# Patient Record
Sex: FEMALE | Race: OTHER
[De-identification: names, ages, dates, MRNs, and addresses within clinical notes are randomized per-mention and may not be internally consistent; named-entity substitution may affect disease eponyms.]

---

## 2017-10-10 ENCOUNTER — RESULT REVIEW (OUTPATIENT)
Age: 72
End: 2017-10-10

## 2017-11-21 ENCOUNTER — OUTPATIENT (OUTPATIENT)
Dept: OUTPATIENT SERVICES | Facility: HOSPITAL | Age: 72
LOS: 1 days | End: 2017-11-21
Payer: COMMERCIAL

## 2017-11-21 DIAGNOSIS — R94.31 ABNORMAL ELECTROCARDIOGRAM [ECG] [EKG]: ICD-10-CM

## 2017-11-21 PROCEDURE — 93017 CV STRESS TEST TRACING ONLY: CPT

## 2017-11-21 PROCEDURE — 78452 HT MUSCLE IMAGE SPECT MULT: CPT

## 2017-11-21 PROCEDURE — A9502: CPT

## 2017-11-22 ENCOUNTER — OUTPATIENT (OUTPATIENT)
Dept: OUTPATIENT SERVICES | Facility: HOSPITAL | Age: 72
LOS: 1 days | End: 2017-11-22
Payer: COMMERCIAL

## 2017-11-22 VITALS
RESPIRATION RATE: 18 BRPM | OXYGEN SATURATION: 97 % | HEIGHT: 63 IN | WEIGHT: 141.98 LBS | SYSTOLIC BLOOD PRESSURE: 140 MMHG | HEART RATE: 78 BPM | TEMPERATURE: 99 F | DIASTOLIC BLOOD PRESSURE: 70 MMHG

## 2017-11-22 DIAGNOSIS — Z98.890 OTHER SPECIFIED POSTPROCEDURAL STATES: Chronic | ICD-10-CM

## 2017-11-22 DIAGNOSIS — Z90.710 ACQUIRED ABSENCE OF BOTH CERVIX AND UTERUS: Chronic | ICD-10-CM

## 2017-11-22 DIAGNOSIS — M16.11 UNILATERAL PRIMARY OSTEOARTHRITIS, RIGHT HIP: ICD-10-CM

## 2017-11-22 DIAGNOSIS — Z01.818 ENCOUNTER FOR OTHER PREPROCEDURAL EXAMINATION: ICD-10-CM

## 2017-11-22 PROCEDURE — 71046 X-RAY EXAM CHEST 2 VIEWS: CPT

## 2017-11-22 PROCEDURE — 86901 BLOOD TYPING SEROLOGIC RH(D): CPT

## 2017-11-22 PROCEDURE — 86850 RBC ANTIBODY SCREEN: CPT

## 2017-11-22 PROCEDURE — 71020: CPT | Mod: 26

## 2017-11-22 PROCEDURE — G0463: CPT

## 2017-11-22 PROCEDURE — 87641 MR-STAPH DNA AMP PROBE: CPT

## 2017-11-22 PROCEDURE — 87640 STAPH A DNA AMP PROBE: CPT

## 2017-11-22 PROCEDURE — 86900 BLOOD TYPING SEROLOGIC ABO: CPT

## 2017-11-22 NOTE — H&P PST ADULT - PSH
History of hand surgery  both hands tendon and bone repair 2000  History of shoulder surgery  right shoulder x 3  S/P bunionectomy  bilateral  S/P hysterectomy

## 2017-11-22 NOTE — H&P PST ADULT - NSANTHOSAYNRD_GEN_A_CORE
No. GAYATHRI screening performed.  STOP BANG Legend: 0-2 = LOW Risk; 3-4 = INTERMEDIATE Risk; 5-8 = HIGH Risk

## 2017-11-22 NOTE — H&P PST ADULT - NEGATIVE CARDIOVASCULAR SYMPTOMS
no orthopnea/no palpitations/no dyspnea on exertion/no paroxysmal nocturnal dyspnea/no chest pain/no peripheral edema/no claudication

## 2017-11-22 NOTE — H&P PST ADULT - FAMILY HISTORY
Father  Still living? No  Family history of heart attack, Age at diagnosis: Age Unknown     Mother  Still living? No  Family history of stroke, Age at diagnosis: Age Unknown     Sibling  Still living? No  Family history of systemic lupus erythematosus, Age at diagnosis: Age Unknown  Family history of cancer, Age at diagnosis: Age Unknown

## 2017-11-23 LAB
MRSA PCR RESULT.: SIGNIFICANT CHANGE UP
S AUREUS DNA NOSE QL NAA+PROBE: SIGNIFICANT CHANGE UP

## 2017-12-04 ENCOUNTER — TRANSCRIPTION ENCOUNTER (OUTPATIENT)
Age: 72
End: 2017-12-04

## 2017-12-04 RX ORDER — TRAMADOL HYDROCHLORIDE 50 MG/1
50 TABLET ORAL ONCE
Qty: 0 | Refills: 0 | Status: DISCONTINUED | OUTPATIENT
Start: 2017-12-05 | End: 2017-12-05

## 2017-12-04 RX ORDER — GABAPENTIN 400 MG/1
100 CAPSULE ORAL ONCE
Qty: 0 | Refills: 0 | Status: COMPLETED | OUTPATIENT
Start: 2017-12-05 | End: 2017-12-05

## 2017-12-04 RX ORDER — SODIUM CHLORIDE 9 MG/ML
3 INJECTION INTRAMUSCULAR; INTRAVENOUS; SUBCUTANEOUS EVERY 8 HOURS
Qty: 0 | Refills: 0 | Status: DISCONTINUED | OUTPATIENT
Start: 2017-12-05 | End: 2017-12-05

## 2017-12-04 RX ORDER — CELECOXIB 200 MG/1
200 CAPSULE ORAL ONCE
Qty: 0 | Refills: 0 | Status: COMPLETED | OUTPATIENT
Start: 2017-12-05 | End: 2017-12-05

## 2017-12-05 ENCOUNTER — INPATIENT (INPATIENT)
Facility: HOSPITAL | Age: 72
LOS: 5 days | Discharge: NURSING, REHAB OR LONG-TERM CARE | DRG: 470 | End: 2017-12-11
Attending: ORTHOPAEDIC SURGERY | Admitting: ORTHOPAEDIC SURGERY
Payer: COMMERCIAL

## 2017-12-05 ENCOUNTER — RESULT REVIEW (OUTPATIENT)
Age: 72
End: 2017-12-05

## 2017-12-05 VITALS
OXYGEN SATURATION: 96 % | DIASTOLIC BLOOD PRESSURE: 66 MMHG | SYSTOLIC BLOOD PRESSURE: 119 MMHG | HEART RATE: 79 BPM | HEIGHT: 63 IN | TEMPERATURE: 98 F | RESPIRATION RATE: 18 BRPM | WEIGHT: 141.98 LBS

## 2017-12-05 DIAGNOSIS — Z98.890 OTHER SPECIFIED POSTPROCEDURAL STATES: Chronic | ICD-10-CM

## 2017-12-05 DIAGNOSIS — M16.11 UNILATERAL PRIMARY OSTEOARTHRITIS, RIGHT HIP: ICD-10-CM

## 2017-12-05 DIAGNOSIS — Z90.710 ACQUIRED ABSENCE OF BOTH CERVIX AND UTERUS: Chronic | ICD-10-CM

## 2017-12-05 LAB
ANION GAP SERPL CALC-SCNC: 8 MMOL/L — SIGNIFICANT CHANGE UP (ref 5–17)
BUN SERPL-MCNC: 16 MG/DL — SIGNIFICANT CHANGE UP (ref 7–18)
CALCIUM SERPL-MCNC: 7.9 MG/DL — LOW (ref 8.4–10.5)
CHLORIDE SERPL-SCNC: 109 MMOL/L — HIGH (ref 96–108)
CO2 SERPL-SCNC: 26 MMOL/L — SIGNIFICANT CHANGE UP (ref 22–31)
CREAT SERPL-MCNC: 0.46 MG/DL — LOW (ref 0.5–1.3)
GLUCOSE SERPL-MCNC: 87 MG/DL — SIGNIFICANT CHANGE UP (ref 70–99)
HCT VFR BLD CALC: 31.5 % — LOW (ref 34.5–45)
HGB BLD-MCNC: 10.2 G/DL — LOW (ref 11.5–15.5)
HIV 1 & 2 AB SERPL IA.RAPID: SIGNIFICANT CHANGE UP
HIV 1+2 AB+HIV1 P24 AG SERPL QL IA: SIGNIFICANT CHANGE UP
MCHC RBC-ENTMCNC: 30.5 PG — SIGNIFICANT CHANGE UP (ref 27–34)
MCHC RBC-ENTMCNC: 32.3 GM/DL — SIGNIFICANT CHANGE UP (ref 32–36)
MCV RBC AUTO: 94.5 FL — SIGNIFICANT CHANGE UP (ref 80–100)
PLATELET # BLD AUTO: 141 K/UL — LOW (ref 150–400)
POTASSIUM SERPL-MCNC: 3.7 MMOL/L — SIGNIFICANT CHANGE UP (ref 3.5–5.3)
POTASSIUM SERPL-SCNC: 3.7 MMOL/L — SIGNIFICANT CHANGE UP (ref 3.5–5.3)
RBC # BLD: 3.33 M/UL — LOW (ref 3.8–5.2)
RBC # FLD: 12.6 % — SIGNIFICANT CHANGE UP (ref 10.3–14.5)
SODIUM SERPL-SCNC: 143 MMOL/L — SIGNIFICANT CHANGE UP (ref 135–145)
WBC # BLD: 7.7 K/UL — SIGNIFICANT CHANGE UP (ref 3.8–10.5)
WBC # FLD AUTO: 7.7 K/UL — SIGNIFICANT CHANGE UP (ref 3.8–10.5)

## 2017-12-05 PROCEDURE — 72170 X-RAY EXAM OF PELVIS: CPT | Mod: 26

## 2017-12-05 PROCEDURE — 88305 TISSUE EXAM BY PATHOLOGIST: CPT | Mod: 26

## 2017-12-05 PROCEDURE — 27066 REMOVE HIP BONE LES DEEP: CPT | Mod: AS,59,RT

## 2017-12-05 PROCEDURE — 88311 DECALCIFY TISSUE: CPT | Mod: 26

## 2017-12-05 PROCEDURE — 27130 TOTAL HIP ARTHROPLASTY: CPT | Mod: AS,RT

## 2017-12-05 PROCEDURE — 73502 X-RAY EXAM HIP UNI 2-3 VIEWS: CPT | Mod: 26,RT

## 2017-12-05 PROCEDURE — 64712 REVISION OF SCIATIC NERVE: CPT | Mod: AS,59,RT

## 2017-12-05 PROCEDURE — 27045 EXC HIP/PELV TUM DEEP 5 CM/>: CPT | Mod: AS,59,RT

## 2017-12-05 RX ORDER — ASCORBIC ACID 60 MG
500 TABLET,CHEWABLE ORAL
Qty: 0 | Refills: 0 | Status: DISCONTINUED | OUTPATIENT
Start: 2017-12-05 | End: 2017-12-11

## 2017-12-05 RX ORDER — INFLUENZA VIRUS VACCINE 15; 15; 15; 15 UG/.5ML; UG/.5ML; UG/.5ML; UG/.5ML
0.5 SUSPENSION INTRAMUSCULAR ONCE
Qty: 0 | Refills: 0 | Status: COMPLETED | OUTPATIENT
Start: 2017-12-05 | End: 2017-12-07

## 2017-12-05 RX ORDER — KETOROLAC TROMETHAMINE 30 MG/ML
15 SYRINGE (ML) INJECTION EVERY 6 HOURS
Qty: 0 | Refills: 0 | Status: DISCONTINUED | OUTPATIENT
Start: 2017-12-05 | End: 2017-12-06

## 2017-12-05 RX ORDER — SENNA PLUS 8.6 MG/1
2 TABLET ORAL AT BEDTIME
Qty: 0 | Refills: 0 | Status: DISCONTINUED | OUTPATIENT
Start: 2017-12-05 | End: 2017-12-11

## 2017-12-05 RX ORDER — SODIUM CHLORIDE 9 MG/ML
1000 INJECTION, SOLUTION INTRAVENOUS
Qty: 0 | Refills: 0 | Status: DISCONTINUED | OUTPATIENT
Start: 2017-12-05 | End: 2017-12-11

## 2017-12-05 RX ORDER — MAGNESIUM HYDROXIDE 400 MG/1
30 TABLET, CHEWABLE ORAL DAILY
Qty: 0 | Refills: 0 | Status: DISCONTINUED | OUTPATIENT
Start: 2017-12-05 | End: 2017-12-11

## 2017-12-05 RX ORDER — HYDROMORPHONE HYDROCHLORIDE 2 MG/ML
0.5 INJECTION INTRAMUSCULAR; INTRAVENOUS; SUBCUTANEOUS
Qty: 0 | Refills: 0 | Status: DISCONTINUED | OUTPATIENT
Start: 2017-12-05 | End: 2017-12-07

## 2017-12-05 RX ORDER — ONDANSETRON 8 MG/1
4 TABLET, FILM COATED ORAL EVERY 6 HOURS
Qty: 0 | Refills: 0 | Status: DISCONTINUED | OUTPATIENT
Start: 2017-12-05 | End: 2017-12-07

## 2017-12-05 RX ORDER — BUPIVACAINE 13.3 MG/ML
20 INJECTION, SUSPENSION, LIPOSOMAL INFILTRATION ONCE
Qty: 0 | Refills: 0 | Status: DISCONTINUED | OUTPATIENT
Start: 2017-12-05 | End: 2017-12-05

## 2017-12-05 RX ORDER — CELECOXIB 200 MG/1
200 CAPSULE ORAL
Qty: 0 | Refills: 0 | Status: DISCONTINUED | OUTPATIENT
Start: 2017-12-06 | End: 2017-12-11

## 2017-12-05 RX ORDER — HYDROMORPHONE HYDROCHLORIDE 2 MG/ML
30 INJECTION INTRAMUSCULAR; INTRAVENOUS; SUBCUTANEOUS
Qty: 0 | Refills: 0 | Status: DISCONTINUED | OUTPATIENT
Start: 2017-12-05 | End: 2017-12-07

## 2017-12-05 RX ORDER — GABAPENTIN 400 MG/1
100 CAPSULE ORAL DAILY
Qty: 0 | Refills: 0 | Status: DISCONTINUED | OUTPATIENT
Start: 2017-12-05 | End: 2017-12-11

## 2017-12-05 RX ORDER — FOLIC ACID 0.8 MG
1 TABLET ORAL DAILY
Qty: 0 | Refills: 0 | Status: DISCONTINUED | OUTPATIENT
Start: 2017-12-05 | End: 2017-12-11

## 2017-12-05 RX ORDER — DOCUSATE SODIUM 100 MG
100 CAPSULE ORAL THREE TIMES A DAY
Qty: 0 | Refills: 0 | Status: DISCONTINUED | OUTPATIENT
Start: 2017-12-05 | End: 2017-12-11

## 2017-12-05 RX ORDER — ENOXAPARIN SODIUM 100 MG/ML
30 INJECTION SUBCUTANEOUS EVERY 12 HOURS
Qty: 0 | Refills: 0 | Status: DISCONTINUED | OUTPATIENT
Start: 2017-12-05 | End: 2017-12-06

## 2017-12-05 RX ORDER — SODIUM CHLORIDE 9 MG/ML
500 INJECTION INTRAMUSCULAR; INTRAVENOUS; SUBCUTANEOUS ONCE
Qty: 0 | Refills: 0 | Status: COMPLETED | OUTPATIENT
Start: 2017-12-05 | End: 2017-12-05

## 2017-12-05 RX ORDER — NALOXONE HYDROCHLORIDE 4 MG/.1ML
0.1 SPRAY NASAL
Qty: 0 | Refills: 0 | Status: DISCONTINUED | OUTPATIENT
Start: 2017-12-05 | End: 2017-12-07

## 2017-12-05 RX ORDER — ONDANSETRON 8 MG/1
4 TABLET, FILM COATED ORAL EVERY 6 HOURS
Qty: 0 | Refills: 0 | Status: DISCONTINUED | OUTPATIENT
Start: 2017-12-05 | End: 2017-12-11

## 2017-12-05 RX ORDER — FERROUS SULFATE 325(65) MG
325 TABLET ORAL
Qty: 0 | Refills: 0 | Status: DISCONTINUED | OUTPATIENT
Start: 2017-12-05 | End: 2017-12-11

## 2017-12-05 RX ADMIN — Medication 325 MILLIGRAM(S): at 17:37

## 2017-12-05 RX ADMIN — Medication 100 MILLIGRAM(S): at 17:37

## 2017-12-05 RX ADMIN — ENOXAPARIN SODIUM 30 MILLIGRAM(S): 100 INJECTION SUBCUTANEOUS at 22:55

## 2017-12-05 RX ADMIN — HYDROMORPHONE HYDROCHLORIDE 30 MILLILITER(S): 2 INJECTION INTRAMUSCULAR; INTRAVENOUS; SUBCUTANEOUS at 16:47

## 2017-12-05 RX ADMIN — HYDROMORPHONE HYDROCHLORIDE 30 MILLILITER(S): 2 INJECTION INTRAMUSCULAR; INTRAVENOUS; SUBCUTANEOUS at 13:47

## 2017-12-05 RX ADMIN — HYDROMORPHONE HYDROCHLORIDE 30 MILLILITER(S): 2 INJECTION INTRAMUSCULAR; INTRAVENOUS; SUBCUTANEOUS at 19:19

## 2017-12-05 RX ADMIN — Medication 500 MILLIGRAM(S): at 17:37

## 2017-12-05 RX ADMIN — SODIUM CHLORIDE 250 MILLILITER(S): 9 INJECTION INTRAMUSCULAR; INTRAVENOUS; SUBCUTANEOUS at 21:00

## 2017-12-05 RX ADMIN — ONDANSETRON 4 MILLIGRAM(S): 8 TABLET, FILM COATED ORAL at 19:56

## 2017-12-05 RX ADMIN — SODIUM CHLORIDE 3 MILLILITER(S): 9 INJECTION INTRAMUSCULAR; INTRAVENOUS; SUBCUTANEOUS at 08:09

## 2017-12-05 RX ADMIN — Medication 1 TABLET(S): at 17:37

## 2017-12-05 RX ADMIN — TRAMADOL HYDROCHLORIDE 50 MILLIGRAM(S): 50 TABLET ORAL at 08:09

## 2017-12-05 RX ADMIN — Medication 1 MILLIGRAM(S): at 17:37

## 2017-12-05 RX ADMIN — GABAPENTIN 100 MILLIGRAM(S): 400 CAPSULE ORAL at 17:37

## 2017-12-05 RX ADMIN — GABAPENTIN 100 MILLIGRAM(S): 400 CAPSULE ORAL at 08:06

## 2017-12-05 RX ADMIN — CELECOXIB 200 MILLIGRAM(S): 200 CAPSULE ORAL at 08:06

## 2017-12-05 RX ADMIN — SODIUM CHLORIDE 80 MILLILITER(S): 9 INJECTION, SOLUTION INTRAVENOUS at 17:24

## 2017-12-05 NOTE — PHYSICAL THERAPY INITIAL EVALUATION ADULT - GENERAL OBSERVATIONS, REHAB EVAL
pt day zero, pacu downgrade, low bp deferral oob activities, able to long sit bedside with assistance, reinforce pre op class TE and hip precautions

## 2017-12-05 NOTE — PHYSICAL THERAPY INITIAL EVALUATION ADULT - IMPAIRMENTS FOUND, PT EVAL
gross motor/ROM/aerobic capacity/endurance/gait, locomotion, and balance/joint integrity and mobility/muscle strength

## 2017-12-06 DIAGNOSIS — G89.18 OTHER ACUTE POSTPROCEDURAL PAIN: ICD-10-CM

## 2017-12-06 DIAGNOSIS — Z96.651 PRESENCE OF RIGHT ARTIFICIAL KNEE JOINT: ICD-10-CM

## 2017-12-06 LAB
ANION GAP SERPL CALC-SCNC: 7 MMOL/L — SIGNIFICANT CHANGE UP (ref 5–17)
BUN SERPL-MCNC: 13 MG/DL — SIGNIFICANT CHANGE UP (ref 7–18)
CALCIUM SERPL-MCNC: 7.3 MG/DL — LOW (ref 8.4–10.5)
CHLORIDE SERPL-SCNC: 107 MMOL/L — SIGNIFICANT CHANGE UP (ref 96–108)
CO2 SERPL-SCNC: 24 MMOL/L — SIGNIFICANT CHANGE UP (ref 22–31)
CREAT SERPL-MCNC: 0.44 MG/DL — LOW (ref 0.5–1.3)
GLUCOSE SERPL-MCNC: 104 MG/DL — HIGH (ref 70–99)
HAV IGM SER-ACNC: SIGNIFICANT CHANGE UP
HBV CORE IGM SER-ACNC: SIGNIFICANT CHANGE UP
HBV SURFACE AG SER-ACNC: SIGNIFICANT CHANGE UP
HCT VFR BLD CALC: 25.4 % — LOW (ref 34.5–45)
HCV AB S/CO SERPL IA: 0.16 S/CO — SIGNIFICANT CHANGE UP
HCV AB SERPL-IMP: SIGNIFICANT CHANGE UP
HGB BLD-MCNC: 8.1 G/DL — LOW (ref 11.5–15.5)
MCHC RBC-ENTMCNC: 30.2 PG — SIGNIFICANT CHANGE UP (ref 27–34)
MCHC RBC-ENTMCNC: 32 GM/DL — SIGNIFICANT CHANGE UP (ref 32–36)
MCV RBC AUTO: 94.3 FL — SIGNIFICANT CHANGE UP (ref 80–100)
PLATELET # BLD AUTO: 132 K/UL — LOW (ref 150–400)
POTASSIUM SERPL-MCNC: 3.7 MMOL/L — SIGNIFICANT CHANGE UP (ref 3.5–5.3)
POTASSIUM SERPL-SCNC: 3.7 MMOL/L — SIGNIFICANT CHANGE UP (ref 3.5–5.3)
RBC # BLD: 2.7 M/UL — LOW (ref 3.8–5.2)
RBC # FLD: 12.2 % — SIGNIFICANT CHANGE UP (ref 10.3–14.5)
SODIUM SERPL-SCNC: 138 MMOL/L — SIGNIFICANT CHANGE UP (ref 135–145)
WBC # BLD: 4.8 K/UL — SIGNIFICANT CHANGE UP (ref 3.8–10.5)
WBC # FLD AUTO: 4.8 K/UL — SIGNIFICANT CHANGE UP (ref 3.8–10.5)

## 2017-12-06 PROCEDURE — 99233 SBSQ HOSP IP/OBS HIGH 50: CPT

## 2017-12-06 RX ORDER — SODIUM CHLORIDE 9 MG/ML
500 INJECTION INTRAMUSCULAR; INTRAVENOUS; SUBCUTANEOUS ONCE
Qty: 0 | Refills: 0 | Status: COMPLETED | OUTPATIENT
Start: 2017-12-06 | End: 2017-12-06

## 2017-12-06 RX ORDER — ENOXAPARIN SODIUM 100 MG/ML
40 INJECTION SUBCUTANEOUS DAILY
Qty: 0 | Refills: 0 | Status: DISCONTINUED | OUTPATIENT
Start: 2017-12-06 | End: 2017-12-11

## 2017-12-06 RX ADMIN — CELECOXIB 200 MILLIGRAM(S): 200 CAPSULE ORAL at 08:02

## 2017-12-06 RX ADMIN — SODIUM CHLORIDE 80 MILLILITER(S): 9 INJECTION, SOLUTION INTRAVENOUS at 18:04

## 2017-12-06 RX ADMIN — Medication 100 MILLIGRAM(S): at 01:32

## 2017-12-06 RX ADMIN — Medication 100 MILLIGRAM(S): at 13:59

## 2017-12-06 RX ADMIN — HYDROMORPHONE HYDROCHLORIDE 30 MILLILITER(S): 2 INJECTION INTRAMUSCULAR; INTRAVENOUS; SUBCUTANEOUS at 07:18

## 2017-12-06 RX ADMIN — SODIUM CHLORIDE 1000 MILLILITER(S): 9 INJECTION INTRAMUSCULAR; INTRAVENOUS; SUBCUTANEOUS at 07:56

## 2017-12-06 RX ADMIN — Medication 325 MILLIGRAM(S): at 11:34

## 2017-12-06 RX ADMIN — Medication 500 MILLIGRAM(S): at 05:03

## 2017-12-06 RX ADMIN — GABAPENTIN 100 MILLIGRAM(S): 400 CAPSULE ORAL at 11:34

## 2017-12-06 RX ADMIN — Medication 15 MILLIGRAM(S): at 08:45

## 2017-12-06 RX ADMIN — Medication 1 MILLIGRAM(S): at 11:32

## 2017-12-06 RX ADMIN — Medication 325 MILLIGRAM(S): at 18:02

## 2017-12-06 RX ADMIN — CELECOXIB 200 MILLIGRAM(S): 200 CAPSULE ORAL at 08:20

## 2017-12-06 RX ADMIN — Medication 30 MILLILITER(S): at 20:13

## 2017-12-06 RX ADMIN — HYDROMORPHONE HYDROCHLORIDE 30 MILLILITER(S): 2 INJECTION INTRAMUSCULAR; INTRAVENOUS; SUBCUTANEOUS at 19:28

## 2017-12-06 RX ADMIN — ONDANSETRON 4 MILLIGRAM(S): 8 TABLET, FILM COATED ORAL at 18:18

## 2017-12-06 RX ADMIN — Medication 1 TABLET(S): at 11:32

## 2017-12-06 RX ADMIN — Medication 15 MILLIGRAM(S): at 09:10

## 2017-12-06 RX ADMIN — ENOXAPARIN SODIUM 40 MILLIGRAM(S): 100 INJECTION SUBCUTANEOUS at 11:33

## 2017-12-06 RX ADMIN — Medication 325 MILLIGRAM(S): at 07:58

## 2017-12-07 ENCOUNTER — TRANSCRIPTION ENCOUNTER (OUTPATIENT)
Age: 72
End: 2017-12-07

## 2017-12-07 LAB
ANION GAP SERPL CALC-SCNC: 8 MMOL/L — SIGNIFICANT CHANGE UP (ref 5–17)
BUN SERPL-MCNC: 7 MG/DL — SIGNIFICANT CHANGE UP (ref 7–18)
CALCIUM SERPL-MCNC: 7.4 MG/DL — LOW (ref 8.4–10.5)
CHLORIDE SERPL-SCNC: 109 MMOL/L — HIGH (ref 96–108)
CO2 SERPL-SCNC: 26 MMOL/L — SIGNIFICANT CHANGE UP (ref 22–31)
CREAT SERPL-MCNC: 0.48 MG/DL — LOW (ref 0.5–1.3)
GLUCOSE SERPL-MCNC: 105 MG/DL — HIGH (ref 70–99)
HCT VFR BLD CALC: 26.5 % — LOW (ref 34.5–45)
HGB BLD-MCNC: 8.4 G/DL — LOW (ref 11.5–15.5)
MCHC RBC-ENTMCNC: 28.8 PG — SIGNIFICANT CHANGE UP (ref 27–34)
MCHC RBC-ENTMCNC: 31.7 GM/DL — LOW (ref 32–36)
MCV RBC AUTO: 90.9 FL — SIGNIFICANT CHANGE UP (ref 80–100)
PLATELET # BLD AUTO: 134 K/UL — LOW (ref 150–400)
POTASSIUM SERPL-MCNC: 3.6 MMOL/L — SIGNIFICANT CHANGE UP (ref 3.5–5.3)
POTASSIUM SERPL-SCNC: 3.6 MMOL/L — SIGNIFICANT CHANGE UP (ref 3.5–5.3)
RBC # BLD: 2.92 M/UL — LOW (ref 3.8–5.2)
RBC # FLD: 13 % — SIGNIFICANT CHANGE UP (ref 10.3–14.5)
SODIUM SERPL-SCNC: 143 MMOL/L — SIGNIFICANT CHANGE UP (ref 135–145)
SURGICAL PATHOLOGY FINAL REPORT - CH: SIGNIFICANT CHANGE UP
WBC # BLD: 5.1 K/UL — SIGNIFICANT CHANGE UP (ref 3.8–10.5)
WBC # FLD AUTO: 5.1 K/UL — SIGNIFICANT CHANGE UP (ref 3.8–10.5)

## 2017-12-07 PROCEDURE — 99233 SBSQ HOSP IP/OBS HIGH 50: CPT

## 2017-12-07 RX ORDER — GABAPENTIN 400 MG/1
1 CAPSULE ORAL
Qty: 0 | Refills: 0 | COMMUNITY
Start: 2017-12-07

## 2017-12-07 RX ORDER — TRAMADOL HYDROCHLORIDE 50 MG/1
50 TABLET ORAL EVERY 8 HOURS
Qty: 0 | Refills: 0 | Status: DISCONTINUED | OUTPATIENT
Start: 2017-12-07 | End: 2017-12-11

## 2017-12-07 RX ORDER — ENOXAPARIN SODIUM 100 MG/ML
40 INJECTION SUBCUTANEOUS
Qty: 0 | Refills: 0 | COMMUNITY
Start: 2017-12-07

## 2017-12-07 RX ORDER — OXYCODONE HYDROCHLORIDE 5 MG/1
5 TABLET ORAL EVERY 6 HOURS
Qty: 0 | Refills: 0 | Status: DISCONTINUED | OUTPATIENT
Start: 2017-12-07 | End: 2017-12-11

## 2017-12-07 RX ORDER — FERROUS SULFATE 325(65) MG
1 TABLET ORAL
Qty: 0 | Refills: 0 | COMMUNITY

## 2017-12-07 RX ORDER — SENNA PLUS 8.6 MG/1
1 TABLET ORAL
Qty: 0 | Refills: 0 | COMMUNITY

## 2017-12-07 RX ORDER — IBUPROFEN 200 MG
1 TABLET ORAL
Qty: 0 | Refills: 0 | COMMUNITY

## 2017-12-07 RX ORDER — TRAMADOL HYDROCHLORIDE 50 MG/1
1 TABLET ORAL
Qty: 0 | Refills: 0 | COMMUNITY
Start: 2017-12-07

## 2017-12-07 RX ORDER — DOCUSATE SODIUM 100 MG
1 CAPSULE ORAL
Qty: 0 | Refills: 0 | COMMUNITY

## 2017-12-07 RX ADMIN — Medication 500 MILLIGRAM(S): at 05:36

## 2017-12-07 RX ADMIN — HYDROMORPHONE HYDROCHLORIDE 30 MILLILITER(S): 2 INJECTION INTRAMUSCULAR; INTRAVENOUS; SUBCUTANEOUS at 07:40

## 2017-12-07 RX ADMIN — ENOXAPARIN SODIUM 40 MILLIGRAM(S): 100 INJECTION SUBCUTANEOUS at 12:48

## 2017-12-07 RX ADMIN — INFLUENZA VIRUS VACCINE 0.5 MILLILITER(S): 15; 15; 15; 15 SUSPENSION INTRAMUSCULAR at 18:12

## 2017-12-07 RX ADMIN — Medication 1 TABLET(S): at 12:48

## 2017-12-07 RX ADMIN — TRAMADOL HYDROCHLORIDE 50 MILLIGRAM(S): 50 TABLET ORAL at 22:02

## 2017-12-07 RX ADMIN — Medication 500 MILLIGRAM(S): at 18:08

## 2017-12-07 RX ADMIN — CELECOXIB 200 MILLIGRAM(S): 200 CAPSULE ORAL at 09:00

## 2017-12-07 RX ADMIN — CELECOXIB 200 MILLIGRAM(S): 200 CAPSULE ORAL at 08:11

## 2017-12-07 RX ADMIN — Medication 325 MILLIGRAM(S): at 12:47

## 2017-12-07 RX ADMIN — TRAMADOL HYDROCHLORIDE 50 MILLIGRAM(S): 50 TABLET ORAL at 22:57

## 2017-12-07 RX ADMIN — Medication 1 MILLIGRAM(S): at 12:48

## 2017-12-07 RX ADMIN — Medication 325 MILLIGRAM(S): at 08:11

## 2017-12-07 RX ADMIN — GABAPENTIN 100 MILLIGRAM(S): 400 CAPSULE ORAL at 12:48

## 2017-12-07 RX ADMIN — Medication 325 MILLIGRAM(S): at 18:08

## 2017-12-07 NOTE — DISCHARGE NOTE ADULT - MEDICATION SUMMARY - MEDICATIONS TO TAKE
I will START or STAY ON the medications listed below when I get home from the hospital:    traMADol 50 mg oral tablet  -- 1 tab(s) by mouth every 8 hours  -- Indication: For Pain    enoxaparin  -- 40 milligram(s) subcutaneous once a day  -- Indication: For dvt ppx    gabapentin 100 mg oral capsule  -- 1 cap(s) by mouth once a day  -- Indication: For As per md    ferrous sulfate 325 mg (65 mg elemental iron) oral tablet  -- 1 tab(s) by mouth 2 times a day  -- Indication: For Anemia    Senna 8.6 mg oral tablet  -- 1 tab(s) by mouth once a day (at bedtime)  -- Indication: For constipation    Colace 100 mg oral capsule  -- 1 cap(s) by mouth 2 times a day  -- Indication: For constipation I will START or STAY ON the medications listed below when I get home from the hospital:    traMADol 50 mg oral tablet  -- 1 tab(s) by mouth every 8 hours  -- Indication: For Pain    celecoxib 200 mg oral capsule  -- 1 cap(s) by mouth once a day (after a meal)  continue for only one week.  -- Indication: For Pain     enoxaparin  -- 40 milligram(s) subcutaneous once a day  -- Indication: For dvt ppx    gabapentin 100 mg oral capsule  -- 1 cap(s) by mouth once a day  -- Indication: For As per md    bacitracin 500 units/g topical ointment  -- 1 application on skin once a day  -- Indication: For Hip     ferrous sulfate 325 mg (65 mg elemental iron) oral tablet  -- 1 tab(s) by mouth 2 times a day  -- Indication: For Anemia    Senna 8.6 mg oral tablet  -- 1 tab(s) by mouth once a day (at bedtime)  -- Indication: For constipation    Colace 100 mg oral capsule  -- 1 cap(s) by mouth 2 times a day  -- Indication: For constipation

## 2017-12-07 NOTE — DISCHARGE NOTE ADULT - CARE PLAN
Principal Discharge DX:	Osteoarthritis  Goal:	improve rom  Instructions for follow-up, activity and diet:	Keep wound/bandage clean, dry and intact. Return to ER if Fever of 101 F or greater develops  Secondary Diagnosis:	Hyperlipidemia  Secondary Diagnosis:	History of shoulder surgery  Secondary Diagnosis:	History of hand surgery  Secondary Diagnosis:	Acute postoperative pain of right knee

## 2017-12-07 NOTE — DISCHARGE NOTE ADULT - ADDITIONAL INSTRUCTIONS
Pain Management- See Attached Medication Reconciliation  **Posterior Hip Precautions for Total hip replacement***    Weight Bearing Status: WBAT of RLE  Equipment needs: Commode, Walker  Dressing: Please keep bandage/dressing Clean, Dry, and Intact.  Incision Site: REMOVE STAPLES on __12/20/2017  STAPLES AND DRESSING TO BE REMOVED BY NURSE  NURSE TO APPLY STERI-STRIPS TO THE WOUND AFTER STAPLE REMOVAL   Dvt prophylaxis: D/C LOVENOX on 12/20/2017  PT/Occupational Therapy are Activities of Daily Living as appropriate  Follow up with Orthopedic Surgeon after STAPLES ARE REMOVED at: [phone]

## 2017-12-07 NOTE — DISCHARGE NOTE ADULT - SECONDARY DIAGNOSIS.
Hyperlipidemia History of shoulder surgery History of hand surgery Acute postoperative pain of right knee

## 2017-12-07 NOTE — DISCHARGE NOTE ADULT - PATIENT PORTAL LINK FT
“You can access the [facility] Patient Portal, offered by [facility], by registering with the following website: [email or web address]”

## 2017-12-07 NOTE — DISCHARGE NOTE ADULT - CARE PROVIDER_API CALL
Long Cortez (MD), Orthopaedic Surgery; Sports Medicine  [address]  [place] [address]  Phone: [phone]  Fax: [phone]

## 2017-12-08 LAB
ANION GAP SERPL CALC-SCNC: 8 MMOL/L — SIGNIFICANT CHANGE UP (ref 5–17)
BUN SERPL-MCNC: 9 MG/DL — SIGNIFICANT CHANGE UP (ref 7–18)
CALCIUM SERPL-MCNC: 7.7 MG/DL — LOW (ref 8.4–10.5)
CHLORIDE SERPL-SCNC: 108 MMOL/L — SIGNIFICANT CHANGE UP (ref 96–108)
CO2 SERPL-SCNC: 25 MMOL/L — SIGNIFICANT CHANGE UP (ref 22–31)
CREAT SERPL-MCNC: 0.45 MG/DL — LOW (ref 0.5–1.3)
GLUCOSE SERPL-MCNC: 100 MG/DL — HIGH (ref 70–99)
HCT VFR BLD CALC: 26.1 % — LOW (ref 34.5–45)
HGB BLD-MCNC: 8.4 G/DL — LOW (ref 11.5–15.5)
MCHC RBC-ENTMCNC: 30.1 PG — SIGNIFICANT CHANGE UP (ref 27–34)
MCHC RBC-ENTMCNC: 32.2 GM/DL — SIGNIFICANT CHANGE UP (ref 32–36)
MCV RBC AUTO: 93.6 FL — SIGNIFICANT CHANGE UP (ref 80–100)
PLATELET # BLD AUTO: 142 K/UL — LOW (ref 150–400)
POTASSIUM SERPL-MCNC: 3.7 MMOL/L — SIGNIFICANT CHANGE UP (ref 3.5–5.3)
POTASSIUM SERPL-SCNC: 3.7 MMOL/L — SIGNIFICANT CHANGE UP (ref 3.5–5.3)
RBC # BLD: 2.78 M/UL — LOW (ref 3.8–5.2)
RBC # FLD: 12.8 % — SIGNIFICANT CHANGE UP (ref 10.3–14.5)
SODIUM SERPL-SCNC: 141 MMOL/L — SIGNIFICANT CHANGE UP (ref 135–145)
WBC # BLD: 5.7 K/UL — SIGNIFICANT CHANGE UP (ref 3.8–10.5)
WBC # FLD AUTO: 5.7 K/UL — SIGNIFICANT CHANGE UP (ref 3.8–10.5)

## 2017-12-08 PROCEDURE — 99233 SBSQ HOSP IP/OBS HIGH 50: CPT

## 2017-12-08 RX ORDER — CELECOXIB 200 MG/1
1 CAPSULE ORAL
Qty: 0 | Refills: 0 | COMMUNITY
Start: 2017-12-08 | End: 2017-12-15

## 2017-12-08 RX ADMIN — ENOXAPARIN SODIUM 40 MILLIGRAM(S): 100 INJECTION SUBCUTANEOUS at 12:15

## 2017-12-08 RX ADMIN — TRAMADOL HYDROCHLORIDE 50 MILLIGRAM(S): 50 TABLET ORAL at 23:00

## 2017-12-08 RX ADMIN — Medication 100 MILLIGRAM(S): at 21:53

## 2017-12-08 RX ADMIN — TRAMADOL HYDROCHLORIDE 50 MILLIGRAM(S): 50 TABLET ORAL at 13:09

## 2017-12-08 RX ADMIN — Medication 325 MILLIGRAM(S): at 08:37

## 2017-12-08 RX ADMIN — TRAMADOL HYDROCHLORIDE 50 MILLIGRAM(S): 50 TABLET ORAL at 21:53

## 2017-12-08 RX ADMIN — Medication 500 MILLIGRAM(S): at 05:52

## 2017-12-08 RX ADMIN — TRAMADOL HYDROCHLORIDE 50 MILLIGRAM(S): 50 TABLET ORAL at 05:52

## 2017-12-08 RX ADMIN — GABAPENTIN 100 MILLIGRAM(S): 400 CAPSULE ORAL at 12:15

## 2017-12-08 RX ADMIN — TRAMADOL HYDROCHLORIDE 50 MILLIGRAM(S): 50 TABLET ORAL at 06:42

## 2017-12-08 RX ADMIN — CELECOXIB 200 MILLIGRAM(S): 200 CAPSULE ORAL at 08:36

## 2017-12-08 RX ADMIN — Medication 325 MILLIGRAM(S): at 12:15

## 2017-12-08 RX ADMIN — Medication 1 MILLIGRAM(S): at 12:15

## 2017-12-08 RX ADMIN — Medication 500 MILLIGRAM(S): at 18:39

## 2017-12-08 RX ADMIN — CELECOXIB 200 MILLIGRAM(S): 200 CAPSULE ORAL at 10:00

## 2017-12-08 RX ADMIN — Medication 100 MILLIGRAM(S): at 13:09

## 2017-12-08 RX ADMIN — Medication 1 TABLET(S): at 12:15

## 2017-12-08 RX ADMIN — TRAMADOL HYDROCHLORIDE 50 MILLIGRAM(S): 50 TABLET ORAL at 13:45

## 2017-12-08 RX ADMIN — Medication 325 MILLIGRAM(S): at 18:39

## 2017-12-08 NOTE — PROGRESS NOTE ADULT - PROBLEM SELECTOR PROBLEM 1
Acute postoperative pain of right knee

## 2017-12-08 NOTE — PROGRESS NOTE ADULT - PROBLEM SELECTOR PROBLEM 2
S/P TKR (total knee replacement), right

## 2017-12-08 NOTE — PROGRESS NOTE ADULT - PROBLEM SELECTOR PLAN 1
- tramadol 50mg po q 6 hours prn  - celebrex 200mg po daily - prescription sent for one week  - gabapentin 100mg po daily - give for one week  - oob  - toradol 15mg ivp q 6 hours prn  - stool softeners
- continue pca hydromorphone  - pca teaching done  - celebrex 200mg po daily  - gabapentin 100mg po daily  - oob  - toradol 15mg ivp q 6 hours prn  - stool softeners
- dc pca  - celebrex 200mg po daily  - gabapentin 100mg po daily  - oob  - toradol 15mg ivp q 6 hours prn  - stool softeners   - tramadol 50mg po q 8   - oxycodone 5mg po q 6 hours prn

## 2017-12-08 NOTE — PROGRESS NOTE ADULT - NEUROLOGICAL DETAILS
responds to pain/responds to verbal commands/alert and oriented x 3
alert and oriented x 3/responds to verbal commands/responds to pain
responds to pain/responds to verbal commands/alert and oriented x 3

## 2017-12-09 LAB
ANION GAP SERPL CALC-SCNC: 8 MMOL/L — SIGNIFICANT CHANGE UP (ref 5–17)
BUN SERPL-MCNC: 12 MG/DL — SIGNIFICANT CHANGE UP (ref 7–18)
CALCIUM SERPL-MCNC: 7.8 MG/DL — LOW (ref 8.4–10.5)
CHLORIDE SERPL-SCNC: 106 MMOL/L — SIGNIFICANT CHANGE UP (ref 96–108)
CO2 SERPL-SCNC: 26 MMOL/L — SIGNIFICANT CHANGE UP (ref 22–31)
CREAT SERPL-MCNC: 0.53 MG/DL — SIGNIFICANT CHANGE UP (ref 0.5–1.3)
GLUCOSE SERPL-MCNC: 96 MG/DL — SIGNIFICANT CHANGE UP (ref 70–99)
HCT VFR BLD CALC: 26.4 % — LOW (ref 34.5–45)
HGB BLD-MCNC: 8.6 G/DL — LOW (ref 11.5–15.5)
MCHC RBC-ENTMCNC: 30.7 PG — SIGNIFICANT CHANGE UP (ref 27–34)
MCHC RBC-ENTMCNC: 32.8 GM/DL — SIGNIFICANT CHANGE UP (ref 32–36)
MCV RBC AUTO: 93.7 FL — SIGNIFICANT CHANGE UP (ref 80–100)
PLATELET # BLD AUTO: 165 K/UL — SIGNIFICANT CHANGE UP (ref 150–400)
POTASSIUM SERPL-MCNC: 3.9 MMOL/L — SIGNIFICANT CHANGE UP (ref 3.5–5.3)
POTASSIUM SERPL-SCNC: 3.9 MMOL/L — SIGNIFICANT CHANGE UP (ref 3.5–5.3)
RBC # BLD: 2.82 M/UL — LOW (ref 3.8–5.2)
RBC # FLD: 13 % — SIGNIFICANT CHANGE UP (ref 10.3–14.5)
SODIUM SERPL-SCNC: 140 MMOL/L — SIGNIFICANT CHANGE UP (ref 135–145)
WBC # BLD: 5.2 K/UL — SIGNIFICANT CHANGE UP (ref 3.8–10.5)
WBC # FLD AUTO: 5.2 K/UL — SIGNIFICANT CHANGE UP (ref 3.8–10.5)

## 2017-12-09 RX ORDER — BACITRACIN ZINC 500 UNIT/G
1 OINTMENT IN PACKET (EA) TOPICAL ONCE
Qty: 0 | Refills: 0 | Status: COMPLETED | OUTPATIENT
Start: 2017-12-10 | End: 2017-12-10

## 2017-12-09 RX ORDER — BACITRACIN ZINC 500 UNIT/G
1 OINTMENT IN PACKET (EA) TOPICAL DAILY
Qty: 0 | Refills: 0 | Status: DISCONTINUED | OUTPATIENT
Start: 2017-12-09 | End: 2017-12-11

## 2017-12-09 RX ADMIN — Medication 100 MILLIGRAM(S): at 05:10

## 2017-12-09 RX ADMIN — TRAMADOL HYDROCHLORIDE 50 MILLIGRAM(S): 50 TABLET ORAL at 14:10

## 2017-12-09 RX ADMIN — Medication 1 TABLET(S): at 11:46

## 2017-12-09 RX ADMIN — Medication 100 MILLIGRAM(S): at 13:39

## 2017-12-09 RX ADMIN — ENOXAPARIN SODIUM 40 MILLIGRAM(S): 100 INJECTION SUBCUTANEOUS at 11:46

## 2017-12-09 RX ADMIN — GABAPENTIN 100 MILLIGRAM(S): 400 CAPSULE ORAL at 11:46

## 2017-12-09 RX ADMIN — TRAMADOL HYDROCHLORIDE 50 MILLIGRAM(S): 50 TABLET ORAL at 06:00

## 2017-12-09 RX ADMIN — CELECOXIB 200 MILLIGRAM(S): 200 CAPSULE ORAL at 09:30

## 2017-12-09 RX ADMIN — Medication 325 MILLIGRAM(S): at 08:45

## 2017-12-09 RX ADMIN — Medication 325 MILLIGRAM(S): at 11:46

## 2017-12-09 RX ADMIN — Medication 100 MILLIGRAM(S): at 22:02

## 2017-12-09 RX ADMIN — TRAMADOL HYDROCHLORIDE 50 MILLIGRAM(S): 50 TABLET ORAL at 13:39

## 2017-12-09 RX ADMIN — Medication 500 MILLIGRAM(S): at 05:10

## 2017-12-09 RX ADMIN — TRAMADOL HYDROCHLORIDE 50 MILLIGRAM(S): 50 TABLET ORAL at 05:08

## 2017-12-09 RX ADMIN — TRAMADOL HYDROCHLORIDE 50 MILLIGRAM(S): 50 TABLET ORAL at 22:35

## 2017-12-09 RX ADMIN — Medication 325 MILLIGRAM(S): at 17:38

## 2017-12-09 RX ADMIN — Medication 500 MILLIGRAM(S): at 17:38

## 2017-12-09 RX ADMIN — Medication 1 APPLICATION(S): at 10:56

## 2017-12-09 RX ADMIN — TRAMADOL HYDROCHLORIDE 50 MILLIGRAM(S): 50 TABLET ORAL at 21:59

## 2017-12-09 RX ADMIN — Medication 1 MILLIGRAM(S): at 11:46

## 2017-12-09 RX ADMIN — CELECOXIB 200 MILLIGRAM(S): 200 CAPSULE ORAL at 08:45

## 2017-12-10 RX ADMIN — Medication 325 MILLIGRAM(S): at 13:12

## 2017-12-10 RX ADMIN — Medication 325 MILLIGRAM(S): at 17:15

## 2017-12-10 RX ADMIN — Medication 100 MILLIGRAM(S): at 06:24

## 2017-12-10 RX ADMIN — Medication 100 MILLIGRAM(S): at 13:12

## 2017-12-10 RX ADMIN — Medication 1 APPLICATION(S): at 13:11

## 2017-12-10 RX ADMIN — TRAMADOL HYDROCHLORIDE 50 MILLIGRAM(S): 50 TABLET ORAL at 06:25

## 2017-12-10 RX ADMIN — ENOXAPARIN SODIUM 40 MILLIGRAM(S): 100 INJECTION SUBCUTANEOUS at 13:12

## 2017-12-10 RX ADMIN — Medication 100 MILLIGRAM(S): at 21:48

## 2017-12-10 RX ADMIN — TRAMADOL HYDROCHLORIDE 50 MILLIGRAM(S): 50 TABLET ORAL at 13:12

## 2017-12-10 RX ADMIN — Medication 325 MILLIGRAM(S): at 08:28

## 2017-12-10 RX ADMIN — Medication 1 APPLICATION(S): at 08:28

## 2017-12-10 RX ADMIN — GABAPENTIN 100 MILLIGRAM(S): 400 CAPSULE ORAL at 13:12

## 2017-12-10 RX ADMIN — Medication 1 MILLIGRAM(S): at 13:11

## 2017-12-10 RX ADMIN — CELECOXIB 200 MILLIGRAM(S): 200 CAPSULE ORAL at 08:28

## 2017-12-10 RX ADMIN — TRAMADOL HYDROCHLORIDE 50 MILLIGRAM(S): 50 TABLET ORAL at 13:50

## 2017-12-10 RX ADMIN — TRAMADOL HYDROCHLORIDE 50 MILLIGRAM(S): 50 TABLET ORAL at 06:50

## 2017-12-10 RX ADMIN — Medication 1 TABLET(S): at 13:12

## 2017-12-10 RX ADMIN — Medication 500 MILLIGRAM(S): at 06:25

## 2017-12-10 RX ADMIN — TRAMADOL HYDROCHLORIDE 50 MILLIGRAM(S): 50 TABLET ORAL at 21:48

## 2017-12-10 RX ADMIN — Medication 500 MILLIGRAM(S): at 17:15

## 2017-12-10 RX ADMIN — CELECOXIB 200 MILLIGRAM(S): 200 CAPSULE ORAL at 09:00

## 2017-12-10 RX ADMIN — TRAMADOL HYDROCHLORIDE 50 MILLIGRAM(S): 50 TABLET ORAL at 22:40

## 2017-12-11 VITALS
DIASTOLIC BLOOD PRESSURE: 57 MMHG | OXYGEN SATURATION: 100 % | RESPIRATION RATE: 18 BRPM | TEMPERATURE: 98 F | SYSTOLIC BLOOD PRESSURE: 100 MMHG | HEART RATE: 77 BPM

## 2017-12-11 RX ORDER — BACITRACIN ZINC 500 UNIT/G
1 OINTMENT IN PACKET (EA) TOPICAL
Qty: 0 | Refills: 0 | COMMUNITY
Start: 2017-12-11

## 2017-12-11 RX ADMIN — Medication 1 MILLIGRAM(S): at 14:19

## 2017-12-11 RX ADMIN — Medication 325 MILLIGRAM(S): at 14:19

## 2017-12-11 RX ADMIN — CELECOXIB 200 MILLIGRAM(S): 200 CAPSULE ORAL at 10:00

## 2017-12-11 RX ADMIN — ENOXAPARIN SODIUM 40 MILLIGRAM(S): 100 INJECTION SUBCUTANEOUS at 14:19

## 2017-12-11 RX ADMIN — OXYCODONE HYDROCHLORIDE 5 MILLIGRAM(S): 5 TABLET ORAL at 10:10

## 2017-12-11 RX ADMIN — Medication 1 TABLET(S): at 14:18

## 2017-12-11 RX ADMIN — TRAMADOL HYDROCHLORIDE 50 MILLIGRAM(S): 50 TABLET ORAL at 14:22

## 2017-12-11 RX ADMIN — Medication 100 MILLIGRAM(S): at 14:23

## 2017-12-11 RX ADMIN — CELECOXIB 200 MILLIGRAM(S): 200 CAPSULE ORAL at 09:05

## 2017-12-11 RX ADMIN — OXYCODONE HYDROCHLORIDE 5 MILLIGRAM(S): 5 TABLET ORAL at 09:10

## 2017-12-11 RX ADMIN — Medication 325 MILLIGRAM(S): at 09:04

## 2017-12-11 RX ADMIN — Medication 500 MILLIGRAM(S): at 05:59

## 2017-12-11 RX ADMIN — TRAMADOL HYDROCHLORIDE 50 MILLIGRAM(S): 50 TABLET ORAL at 05:59

## 2017-12-11 RX ADMIN — Medication 1 APPLICATION(S): at 14:19

## 2017-12-11 RX ADMIN — Medication 100 MILLIGRAM(S): at 05:59

## 2017-12-11 RX ADMIN — GABAPENTIN 100 MILLIGRAM(S): 400 CAPSULE ORAL at 14:18

## 2017-12-11 RX ADMIN — TRAMADOL HYDROCHLORIDE 50 MILLIGRAM(S): 50 TABLET ORAL at 07:00

## 2017-12-11 NOTE — PROGRESS NOTE ADULT - SUBJECTIVE AND OBJECTIVE BOX
Ortho Note POD# 3  72yFemale    Diagnosis:  S/p Right Total Hip Replacement POD# 4    Patient is seen and evaluated at bedside; offers no acute complaints. Pain is mild; well controlled.  Has been OOB with PT; ambulation with walker    Denies CP/SOB, palpitations, dyspnea, paresthesias, N/V    Vital Signs Last 24 Hrs  T(C): 36.7 (09 Dec 2017 06:23), Max: 37.1 (08 Dec 2017 14:13)  T(F): 98.1 (09 Dec 2017 06:23), Max: 98.7 (08 Dec 2017 14:13)  HR: 95 (09 Dec 2017 10:01) (71 - 95)  BP: 135/80 (09 Dec 2017 10:01) (95/67 - 135/80)  BP(mean): --  RR: 16 (09 Dec 2017 06:23) (16 - 17)  SpO2: 97% (09 Dec 2017 06:23) (97% - 98%)    Physical Exam:    General: AAOx3, in NAD, resting in bed.    Right hip:  In nl. alignment. Abduction pillow intact. Skin pink, warm. Wound C/D/I; healing well.  Staples intact. Calves are soft, non-tender. 2+pulses. NVI.  Lower ext: No CT, calves, 2+pedal pulses, NVI. 5/5 strength of ehl/ta/gs b/l.                             8.6    5.2   )-----------( 165      ( 09 Dec 2017 06:27 )             26.4     12-09    140  |  106  |  12  ----------------------------<  96  3.9   |  26  |  0.53    Ca    7.8<L>      09 Dec 2017 06:27        Impression:  72yFemale S/p Right total hip replacement POD# 4  Plan:  -  Continue pain management  -  DVT prophylaxis with Lovenox  -  Daily Physical Therapy:  WBAT on RLE with walker  - *Total hip precautions (bedside commode, trapeze bar, high chair, abduction pillow between legs when in bed)  -  Discharge planning to possible rehab  -  Dressing changed  -  D/c Lovenox and d/c staples in 10 days  -  Encouraged use of incentive spirometer  -  Case d/w 
Ortho Note POD# 3  72yFemale    Diagnosis:  S/p Right Total Hip Replacement POD# 5    Patient is seen and evaluated at bedside; offers no acute complaints. Pain is mild; well controlled.  Has been OOB with PT; ambulation with walker    Denies CP/SOB, palpitations, dyspnea, paresthesias, N/V    Vital Signs Last 24 Hrs  T(C): 36.7 (10 Dec 2017 06:44), Max: 36.9 (09 Dec 2017 14:01)  T(F): 98 (10 Dec 2017 06:44), Max: 98.5 (09 Dec 2017 14:01)  HR: 65 (10 Dec 2017 06:44) (65 - 75)  BP: 110/67 (10 Dec 2017 06:44) (102/64 - 110/67)  BP(mean): --  RR: 18 (10 Dec 2017 06:44) (16 - 18)  SpO2: 100% (10 Dec 2017 06:44) (95% - 100%)    Physical Exam:    General: AAOx3, in NAD, resting in bed.    Right hip:  In nl. alignment. Abduction pillow intact. Skin pink, warm. Wound C/D/I; healing well.  Staples intact. Calves are soft, non-tender. 2+pulses. NVI.  Lower ext: No CT, calves, 2+pedal pulses, NVI. 5/5 strength of ehl/ta/gs b/l.                             8.6    5.2   )-----------( 165      ( 09 Dec 2017 06:27 )             26.4     12-09    140  |  106  |  12  ----------------------------<  96  3.9   |  26  |  0.53    Ca    7.8<L>      09 Dec 2017 06:27        Impression:  72yFemale S/p Right total hip replacement POD# 5  Plan:  -  Continue pain management  -  DVT prophylaxis with Lovenox  -  Daily Physical Therapy:  WBAT on RLE with walker  - *Total hip precautions (bedside commode, trapeze bar, high chair, abduction pillow between legs when in bed)  -  Discharge planning today pending auth.  Appeal was sent it.  Plan as per case management  - case d/w dr shanks
Chief Complaint: right hip pain    HPI:   72y Female s/p right hip replacement, pod#2.  Pt is oob and in chair, nad.  + complaints of right hip pain which is alleviated by PCA.  No nausea or vomiting.        PAIN SCORE:   4/10      SCALE USED: (1-10 VNRS)    Allergies    Keflex (Rash)  Tylenol (Pruritus; Rash)    Intolerances      MEDICATIONS  (STANDING):  ascorbic acid 500 milliGRAM(s) Oral two times a day  celecoxib 200 milliGRAM(s) Oral after breakfast  docusate sodium 100 milliGRAM(s) Oral three times a day  enoxaparin Injectable 40 milliGRAM(s) SubCutaneous daily  ferrous    sulfate 325 milliGRAM(s) Oral three times a day with meals  folic acid 1 milliGRAM(s) Oral daily  gabapentin 100 milliGRAM(s) Oral daily  influenza   Vaccine 0.5 milliLiter(s) IntraMuscular once  multivitamin 1 Tablet(s) Oral daily  sodium chloride 0.45%. 1000 milliLiter(s) (80 mL/Hr) IV Continuous <Continuous>  traMADol 50 milliGRAM(s) Oral every 8 hours    MEDICATIONS  (PRN):  aluminum hydroxide/magnesium hydroxide/simethicone Suspension 30 milliLiter(s) Oral four times a day PRN Indigestion  ketorolac   Injectable 15 milliGRAM(s) IV Push every 6 hours PRN Moderate Pain (4 - 6)  magnesium hydroxide Suspension 30 milliLiter(s) Oral daily PRN Constipation  ondansetron Injectable 4 milliGRAM(s) IV Push every 6 hours PRN Nausea and/or Vomiting  oxyCODONE    IR 5 milliGRAM(s) Oral every 6 hours PRN Severe Pain (7 - 10)  senna 2 Tablet(s) Oral at bedtime PRN Constipation      PHYSICAL EXAM:    Vital Signs Last 24 Hrs  T(C): 36.9 (07 Dec 2017 05:40), Max: 37.6 (07 Dec 2017 00:16)  T(F): 98.4 (07 Dec 2017 05:40), Max: 99.7 (07 Dec 2017 00:16)  HR: 89 (07 Dec 2017 09:45) (78 - 89)  BP: 110/54 (07 Dec 2017 09:45) (97/51 - 120/58)  BP(mean): --  RR: 16 (07 Dec 2017 05:40) (16 - 17)  SpO2: 96% (07 Dec 2017 09:45) (95% - 98%)             LABS:                          8.4    5.1   )-----------( 134      ( 07 Dec 2017 06:09 )             26.5     12-07    143  |  109<H>  |  7   ----------------------------<  105<H>  3.6   |  26  |  0.48<L>    Ca    7.4<L>      07 Dec 2017 06:09            Drug Screen:        RADIOLOGY:
Chief Complaint: right hip pain    HPI:   72y Female s/p right hip replacement, pod#3.  Pt for discharge today to rehab.  No nausea or vomiting.  No chest pain or sob.  + mild right hip pain which worsens on exertion.       PAIN SCORE:    4/10    SCALE USED: (1-10 VNRS)    Allergies    Keflex (Rash)  Tylenol (Pruritus; Rash)    Intolerances      MEDICATIONS  (STANDING):  ascorbic acid 500 milliGRAM(s) Oral two times a day  celecoxib 200 milliGRAM(s) Oral after breakfast  docusate sodium 100 milliGRAM(s) Oral three times a day  enoxaparin Injectable 40 milliGRAM(s) SubCutaneous daily  ferrous    sulfate 325 milliGRAM(s) Oral three times a day with meals  folic acid 1 milliGRAM(s) Oral daily  gabapentin 100 milliGRAM(s) Oral daily  multivitamin 1 Tablet(s) Oral daily  sodium chloride 0.45%. 1000 milliLiter(s) (80 mL/Hr) IV Continuous <Continuous>  traMADol 50 milliGRAM(s) Oral every 8 hours    MEDICATIONS  (PRN):  aluminum hydroxide/magnesium hydroxide/simethicone Suspension 30 milliLiter(s) Oral four times a day PRN Indigestion  ketorolac   Injectable 15 milliGRAM(s) IV Push every 6 hours PRN Moderate Pain (4 - 6)  magnesium hydroxide Suspension 30 milliLiter(s) Oral daily PRN Constipation  ondansetron Injectable 4 milliGRAM(s) IV Push every 6 hours PRN Nausea and/or Vomiting  oxyCODONE    IR 5 milliGRAM(s) Oral every 6 hours PRN Severe Pain (7 - 10)  senna 2 Tablet(s) Oral at bedtime PRN Constipation      PHYSICAL EXAM:    Vital Signs Last 24 Hrs  T(C): 37 (08 Dec 2017 05:58), Max: 37.3 (07 Dec 2017 21:48)  T(F): 98.6 (08 Dec 2017 05:58), Max: 99.2 (07 Dec 2017 21:48)  HR: 73 (08 Dec 2017 05:58) (73 - 87)  BP: 101/53 (08 Dec 2017 05:58) (97/55 - 106/56)  BP(mean): --  RR: 18 (08 Dec 2017 05:58) (15 - 18)  SpO2: 99% (08 Dec 2017 05:58) (95% - 99%)             LABS:                          8.4    5.7   )-----------( 142      ( 08 Dec 2017 07:15 )             26.1     12-08    141  |  108  |  9   ----------------------------<  100<H>  3.7   |  25  |  0.45<L>    Ca    7.7<L>      08 Dec 2017 07:15            Drug Screen:        RADIOLOGY:
Chief Complaint: right knee pain    HPI:   72y Female s/p right knee replacement, pod#1.  Pt complaining of right knee pain which worsens on exertion. Pt was oob and ambulating.  No nausea or vomiting.  Pt on pca hydromorphone.  PCA teaching done.        PAIN SCORE:     4/10    SCALE USED: (1-10 VNRS)    Allergies    Keflex (Rash)  Tylenol (Pruritus; Rash)    Intolerances      MEDICATIONS  (STANDING):  ascorbic acid 500 milliGRAM(s) Oral two times a day  celecoxib 200 milliGRAM(s) Oral after breakfast  docusate sodium 100 milliGRAM(s) Oral three times a day  enoxaparin Injectable 40 milliGRAM(s) SubCutaneous daily  ferrous    sulfate 325 milliGRAM(s) Oral three times a day with meals  folic acid 1 milliGRAM(s) Oral daily  gabapentin 100 milliGRAM(s) Oral daily  HYDROmorphone PCA (1 mG/mL) 30 milliLiter(s) PCA Continuous PCA Continuous  influenza   Vaccine 0.5 milliLiter(s) IntraMuscular once  multivitamin 1 Tablet(s) Oral daily  sodium chloride 0.45%. 1000 milliLiter(s) (80 mL/Hr) IV Continuous <Continuous>    MEDICATIONS  (PRN):  aluminum hydroxide/magnesium hydroxide/simethicone Suspension 30 milliLiter(s) Oral four times a day PRN Indigestion  HYDROmorphone  Injectable 0.5 milliGRAM(s) IV Push every 10 minutes PRN Moderate Pain (4 - 6)  ketorolac   Injectable 15 milliGRAM(s) IV Push every 6 hours PRN Moderate Pain (4 - 6)  magnesium hydroxide Suspension 30 milliLiter(s) Oral daily PRN Constipation  naloxone Injectable 0.1 milliGRAM(s) IV Push every 3 minutes PRN For ANY of the following changes in patient status:  A. RR LESS THAN 10 breaths per minute, B. Oxygen saturation LESS THAN 90%, C. Sedation score of 6  ondansetron Injectable 4 milliGRAM(s) IV Push every 6 hours PRN Nausea  ondansetron Injectable 4 milliGRAM(s) IV Push every 6 hours PRN Nausea and/or Vomiting  senna 2 Tablet(s) Oral at bedtime PRN Constipation      PHYSICAL EXAM:    Vital Signs Last 24 Hrs  T(C): 37.3 (06 Dec 2017 05:00), Max: 37.3 (06 Dec 2017 05:00)  T(F): 99.2 (06 Dec 2017 05:00), Max: 99.2 (06 Dec 2017 05:00)  HR: 94 (06 Dec 2017 10:10) (58 - 94)  BP: 109/83 (06 Dec 2017 10:10) (89/48 - 111/70)  BP(mean): --  RR: 15 (06 Dec 2017 05:00) (10 - 18)  SpO2: 94% (06 Dec 2017 10:10) (94% - 100%)             LABS:                          8.1    4.8   )-----------( 132      ( 06 Dec 2017 06:16 )             25.4     12-06    138  |  107  |  13  ----------------------------<  104<H>  3.7   |  24  |  0.44<L>    Ca    7.3<L>      06 Dec 2017 06:16            Drug Screen:        RADIOLOGY:
Ortho Note POD# 1  72yFemale    Diagnosis:  S/p Right Total Hip Replacement POD# 1    Patient is seen and evaluated at bedside; offers no acute complaints. Pain is mild; well controlled.  Awaiting PT for ambulation.    Denies CP/SOB, palpitations, dyspnea, paresthesias, N/V    Vital Signs Last 24 Hrs  T(C): 37.3 (06 Dec 2017 05:00), Max: 37.3 (06 Dec 2017 05:00)  T(F): 99.2 (06 Dec 2017 05:00), Max: 99.2 (06 Dec 2017 05:00)  HR: 85 (06 Dec 2017 05:00) (58 - 85)  BP: 90/47 (06 Dec 2017 05:00) (89/48 - 119/66)  BP(mean): --  RR: 15 (06 Dec 2017 05:00) (10 - 18)  SpO2: 95% (06 Dec 2017 05:00) (95% - 100%)  I&O's Detail    05 Dec 2017 07:01  -  06 Dec 2017 07:00  --------------------------------------------------------  IN:    IV PiggyBack: 2600 mL    sodium chloride 0.45%.: 240 mL  Total IN: 2840 mL    OUT:    Indwelling Catheter - Urethral: 1600 mL  Total OUT: 1600 mL    Total NET: 1240 mL          Physical Exam:    General: AAOx3, in NAD, resting comfortably in bed.    Right hip:  In nl. alignment. Abduction pillow intact. Dressing is C/D/I.  Calves are soft, non-tender. 2+pulses. NVI. Skin pink, warm.  Lower ext: No CT, calves soft, 2+pedal pulses. NVI. 5/5 strength of ehl/ta/gs b/l.                           8.1    4.8   )-----------( 132      ( 06 Dec 2017 06:16 )             25.4     12-06    138  |  107  |  13  ----------------------------<  104<H>  3.7   |  24  |  0.44<L>    Ca    7.3<L>      06 Dec 2017 06:16        Impression:  72yFemale S/p Right total hip replacement POD# 1 with acute blood loss anemia  Plan:  -  Continue pain management  -  DVT prophylaxis with Lovenox  -  Daily Physical Therapy:  WBAT on RLE with walker  - *Total hip precautions (bedside commode, trapeze bar, high chair, abduction pillow between legs when in bed)  -  Discharge planning: Home Vs. Rehab pending Physical therapy eval.  -  Continue Antibiotics x 24hrs post-op  -  Discontinue Young catheter today  -  Encouraged use of incentive spirometer  -  Case d/w   -  Transfuse 1 unit prbc today for low h/h
Ortho Note POD# 2  72yFemale    Diagnosis:  S/p Right Total Hip Replacement POD# 2    Patient is seen and evaluated at bedside; offers no acute complaints. Pain is mild; well controlled.  Has been OOB with PT; ambulation with walker    Denies CP/SOB, palpitations, dyspnea, paresthesias, N/V    Vital Signs Last 24 Hrs  T(C): 36.9 (07 Dec 2017 05:40), Max: 37.6 (07 Dec 2017 00:16)  T(F): 98.4 (07 Dec 2017 05:40), Max: 99.7 (07 Dec 2017 00:16)  HR: 78 (07 Dec 2017 05:40) (78 - 94)  BP: 106/52 (07 Dec 2017 05:40) (97/51 - 120/58)  BP(mean): --  RR: 16 (07 Dec 2017 05:40) (16 - 17)  SpO2: 95% (07 Dec 2017 05:40) (94% - 98%)    Physical Exam:    General: AAOx3, in NAD, resting in bed.    Right hip:  In nl. alignment. Abduction pillow intact. Skin pink, warm. Wound C/D with no drainage; healing well.  Staples intact. Calves are soft, non-tender. 2+pulses. NVI.  Lower ext: No CT, Calves soft, 2+pedal pulses, NVI. 5/5 strength of ehl/ta/gs b/l.                           8.4    5.1   )-----------( 134      ( 07 Dec 2017 06:09 )             26.5     12-07    143  |  109<H>  |  7   ----------------------------<  105<H>  3.6   |  26  |  0.48<L>    Ca    7.4<L>      07 Dec 2017 06:09        Impression:  72yFemale S/p Right total hip replacement POD# 2  Plan:  -  Continue pain management  -  DVT prophylaxis with Lovenox  -  Daily Physical Therapy:  WBAT on RLE with walker  - *Total hip precautions (bedside commode, trapeze bar, high chair, abduction pillow between legs when in bed)  -  Discharge planning for tomorrow  -  Dressing changed  -  Encouraged use of incentive spirometer  -  Case d/w Dr. Cortez
Ortho Note POD# 3  72yFemale    Diagnosis:  S/p Right Total Hip Replacement POD# 3    Patient is seen and evaluated at bedside; offers no acute complaints. Pain is mild; well controlled.  Has been OOB with PT; ambulation with walker    Vital Signs Last 24 Hrs  T(C): 37 (08 Dec 2017 05:58), Max: 37.3 (07 Dec 2017 21:48)  T(F): 98.6 (08 Dec 2017 05:58), Max: 99.2 (07 Dec 2017 21:48)  HR: 73 (08 Dec 2017 05:58) (73 - 87)  BP: 101/53 (08 Dec 2017 05:58) (97/55 - 106/56)  BP(mean): --  RR: 18 (08 Dec 2017 05:58) (15 - 18)  SpO2: 99% (08 Dec 2017 05:58) (95% - 99%)    Physical Exam:    General: AAOx3, in NAD, resting in bed.    Right hip:  In nl. alignment. Wound C/D/I; healing well.  Staples intact. Calves are soft, non-tender. 2+pulses. NVI.                          8.4    5.7   )-----------( 142      ( 08 Dec 2017 07:15 )             26.1     12-08    141  |  108  |  9   ----------------------------<  100<H>  3.7   |  25  |  0.45<L>    Ca    7.7<L>      08 Dec 2017 07:15        Impression:  72yFemale S/p Right total hip replacement POD# 3  Plan:  -  Continue pain management  -  DVT prophylaxis with Lovenox  -  Daily Physical Therapy:  WBAT on RLE with walker  - *Total hip precautions (bedside commode, trapeze bar, high chair, abduction pillow between legs when in bed)  -  Discharge planning today to rehab if auth. provided  -  Dressing changed  -  D/c Lovenox and d/c staples in 11 days  -  Encouraged use of incentive spirometer  -  Case d/w 
Ortho Note POD# 6  72yFemale    Diagnosis:  S/p Right Total Hip Replacement POD# 6    Patient is seen and evaluated at bedside; offers no acute complaints. Pain is mild; well controlled.  Has been OOB with PT; ambulation with walker    Vital Signs Last 24 Hrs  T(C): 36.8 (11 Dec 2017 06:30), Max: 36.8 (11 Dec 2017 06:30)  T(F): 98.2 (11 Dec 2017 06:30), Max: 98.2 (11 Dec 2017 06:30)  HR: 66 (11 Dec 2017 06:30) (66 - 80)  BP: 108/64 (11 Dec 2017 06:30) (102/67 - 108/64)  BP(mean): --  RR: 18 (11 Dec 2017 06:30) (16 - 18)  SpO2: 96% (11 Dec 2017 06:30) (96% - 99%)    Physical Exam:    General: AAOx3, in NAD, resting in bed.    Right hip:  In nl. alignment. Wound C/D/I; healing well.  Staples intact. Calves are soft, non-tender. 2+pulses. NVI.          no new labs    Impression:  72yFemale S/p Right total hip replacement POD# 6  Plan:  -  Continue pain management  -  DVT prophylaxis with Lovenox  -  Daily Physical Therapy:  WBAT on RLE with walker  - *Total hip precautions (bedside commode, trapeze bar, high chair, abduction pillow between legs when in bed)  -  Discharge planning today  -  Dressing changed  -  D/c Lovenox and d/c staples in 8 days  -  Encouraged use of incentive spirometer  -  Case d/w

## 2017-12-11 NOTE — PROGRESS NOTE ADULT - PROVIDER SPECIALTY LIST ADULT
Orthopedics
Pain Medicine
Orthopedics
Orthopedics

## 2017-12-19 PROCEDURE — 36430 TRANSFUSION BLD/BLD COMPNT: CPT

## 2017-12-19 PROCEDURE — 97530 THERAPEUTIC ACTIVITIES: CPT

## 2017-12-19 PROCEDURE — 85027 COMPLETE CBC AUTOMATED: CPT

## 2017-12-19 PROCEDURE — 80074 ACUTE HEPATITIS PANEL: CPT

## 2017-12-19 PROCEDURE — C1776: CPT

## 2017-12-19 PROCEDURE — C1889: CPT

## 2017-12-19 PROCEDURE — 97161 PT EVAL LOW COMPLEX 20 MIN: CPT

## 2017-12-19 PROCEDURE — 86703 HIV-1/HIV-2 1 RESULT ANTBDY: CPT

## 2017-12-19 PROCEDURE — 86900 BLOOD TYPING SEROLOGIC ABO: CPT

## 2017-12-19 PROCEDURE — 97116 GAIT TRAINING THERAPY: CPT

## 2017-12-19 PROCEDURE — 97110 THERAPEUTIC EXERCISES: CPT

## 2017-12-19 PROCEDURE — 87389 HIV-1 AG W/HIV-1&-2 AB AG IA: CPT

## 2017-12-19 PROCEDURE — P9040: CPT

## 2017-12-19 PROCEDURE — 86850 RBC ANTIBODY SCREEN: CPT

## 2017-12-19 PROCEDURE — 90686 IIV4 VACC NO PRSV 0.5 ML IM: CPT

## 2017-12-19 PROCEDURE — 80048 BASIC METABOLIC PNL TOTAL CA: CPT

## 2017-12-19 PROCEDURE — 86901 BLOOD TYPING SEROLOGIC RH(D): CPT

## 2017-12-19 PROCEDURE — 73502 X-RAY EXAM HIP UNI 2-3 VIEWS: CPT

## 2017-12-19 PROCEDURE — 86920 COMPATIBILITY TEST SPIN: CPT

## 2018-10-02 PROBLEM — Z00.00 ENCOUNTER FOR PREVENTIVE HEALTH EXAMINATION: Status: ACTIVE | Noted: 2018-10-02

## 2019-03-11 PROBLEM — E78.5 HYPERLIPIDEMIA, UNSPECIFIED: Chronic | Status: ACTIVE | Noted: 2017-11-22

## 2019-03-11 PROBLEM — M19.90 UNSPECIFIED OSTEOARTHRITIS, UNSPECIFIED SITE: Chronic | Status: ACTIVE | Noted: 2017-11-22

## 2019-03-21 ENCOUNTER — APPOINTMENT (OUTPATIENT)
Dept: ORTHOPEDIC SURGERY | Facility: CLINIC | Age: 74
End: 2019-03-21
Payer: MEDICARE

## 2019-03-21 VITALS — HEIGHT: 63 IN | BODY MASS INDEX: 25.69 KG/M2 | WEIGHT: 145 LBS

## 2019-03-21 DIAGNOSIS — Z78.9 OTHER SPECIFIED HEALTH STATUS: ICD-10-CM

## 2019-03-21 DIAGNOSIS — M21.70 UNEQUAL LIMB LENGTH (ACQUIRED), UNSPECIFIED SITE: ICD-10-CM

## 2019-03-21 DIAGNOSIS — Z87.39 PERSONAL HISTORY OF OTHER DISEASES OF THE MUSCULOSKELETAL SYSTEM AND CONNECTIVE TISSUE: ICD-10-CM

## 2019-03-21 PROCEDURE — 72100 X-RAY EXAM L-S SPINE 2/3 VWS: CPT

## 2019-03-21 PROCEDURE — 99214 OFFICE O/P EST MOD 30 MIN: CPT

## 2019-07-09 ENCOUNTER — APPOINTMENT (OUTPATIENT)
Dept: ORTHOPEDIC SURGERY | Facility: CLINIC | Age: 74
End: 2019-07-09
Payer: MEDICARE

## 2019-07-09 VITALS
HEART RATE: 72 BPM | HEIGHT: 62 IN | DIASTOLIC BLOOD PRESSURE: 66 MMHG | WEIGHT: 150 LBS | BODY MASS INDEX: 27.6 KG/M2 | SYSTOLIC BLOOD PRESSURE: 107 MMHG

## 2019-07-09 DIAGNOSIS — Z87.39 PERSONAL HISTORY OF OTHER DISEASES OF THE MUSCULOSKELETAL SYSTEM AND CONNECTIVE TISSUE: ICD-10-CM

## 2019-07-09 DIAGNOSIS — Z80.9 FAMILY HISTORY OF MALIGNANT NEOPLASM, UNSPECIFIED: ICD-10-CM

## 2019-07-09 DIAGNOSIS — Z82.61 FAMILY HISTORY OF ARTHRITIS: ICD-10-CM

## 2019-07-09 PROCEDURE — 99213 OFFICE O/P EST LOW 20 MIN: CPT | Mod: 25

## 2019-07-09 PROCEDURE — 73130 X-RAY EXAM OF HAND: CPT | Mod: LT

## 2019-07-09 PROCEDURE — 20550 NJX 1 TENDON SHEATH/LIGAMENT: CPT | Mod: RT

## 2019-07-09 RX ORDER — IBUPROFEN 200 MG/1
TABLET, COATED ORAL
Refills: 0 | Status: ACTIVE | COMMUNITY

## 2019-07-09 NOTE — HISTORY OF PRESENT ILLNESS
[Right] : right hand dominant [FreeTextEntry1] : She comes in today for evaluation of left Thumb pain and triggering.  She also has complaints of pain and stiffness of her left ring finger.  She is status post a left thumb basal joint arthroplasty in 2002, and a right thumb basal joint arthroplasty in 2006.  She recovered much better on the right side.  These surgeries were performed by 2 different surgeons.

## 2019-07-09 NOTE — DISCUSSION/SUMMARY
[FreeTextEntry1] : She has findings consistent with a left trigger thumb and left ring finger PIP joint pain secondary to arthritis.  She is status post bilateral basal joint arthroplasties by another hand surgeon, in the past, with some residual symptoms on the left side.\par \par I had a discussion regarding today's visit, the diagnosis, and treatment options / recommendations.  At this time, I recommended a cortisone injection for left trigger thumb.  With regard to the left ring finger PIP joint arthritis, I have recommended observation.  The potential benefit of a cortisone injection or surgery in the future was discussed.\par \par The patient has agreed to this plan of management and has expressed full understanding.  All questions were fully answered to the patient's satisfaction.\par \par Over 50% of the time spent with the patient was on counseling the patient on the above diagnosis, treatment plan and prognosis.

## 2019-07-09 NOTE — PHYSICAL EXAM
[de-identified] : - Constitutional: This is a female in no obvious distress.  \par - Psych: Patient is alert and oriented to person, place and time.  Patient has a normal mood and affect.\par - Cardiovascular: Normal pulses throughout the upper extremities.  No significant varicosities are noted in the upper extremities. \par - Neuro: Strength and sensation are intact throughout the upper extremities.  Patient has normal coordination.\par - Respiratory:  Patient exhibits no evidence of shortness of breath or difficulty breathing.\par - Skin: No rashes, lesions, or other abnormalities are noted in the upper extremities.\par \par ---\par \par Examination of both hands demonstrate well-healed basal joint arthroplasty incisions.  She has more notable subsidence on the left side with some tenderness.  She has swelling and tenderness along the left thumb A1 pulley with triggering.  There is no triggering of the other digits.  She has arthritis at the DIP joints and obvious arthritis at the left ring finger PIP joint.  She has tenderness along the left ring finger PIP joint with limited motion.  She has a 20° PIP joint flexion contracture with approximately 70° of flexion.  She is neurovascularly intact distally. [de-identified] : \par AP, lateral and oblique radiographs of both hands demonstrate her bilateral basal joint arthroplasties, with subsidence.  There is advanced arthritis at the left ring finger PIP joint, as well as arthritis at the DIP joints of the digits and narrowing of the PIP joints of the other digits.

## 2019-07-09 NOTE — PROCEDURE
[FreeTextEntry1] : -  After a discussion of risks and benefits, the patient agreed to proceed with a cortisone injection.  \par -  Side: Left \par -  Finger: Thumb\par -  Medications: 0.5 cc of 1% Lidocaine and 1 cc of Betamethasone, 6mg/cc, using sterile technique.\par -  Patient tolerated the procedure well, without complications.\par -  Patient was told that the symptoms may worsen for a day or two, and should then begin to improve. \par -  Instructions: Patient was instructed on activity modification for the next several days.\par -  Follow-up: Within 4 weeks to assess response to the injection.

## 2019-07-31 ENCOUNTER — APPOINTMENT (OUTPATIENT)
Dept: ORTHOPEDIC SURGERY | Facility: CLINIC | Age: 74
End: 2019-07-31
Payer: MEDICARE

## 2019-08-01 PROBLEM — M19.041 ARTHRITIS OF HAND, RIGHT: Status: ACTIVE | Noted: 2019-07-09

## 2019-08-01 PROBLEM — M19.042 ARTHRITIS OF HAND, LEFT: Status: ACTIVE | Noted: 2019-07-09

## 2019-08-01 PROBLEM — M65.311 TRIGGER FINGER OF RIGHT THUMB: Status: ACTIVE | Noted: 2019-07-09

## 2019-08-07 ENCOUNTER — APPOINTMENT (OUTPATIENT)
Dept: ORTHOPEDIC SURGERY | Facility: CLINIC | Age: 74
End: 2019-08-07
Payer: MEDICARE

## 2019-08-07 DIAGNOSIS — M65.311 TRIGGER THUMB, RIGHT THUMB: ICD-10-CM

## 2019-08-07 DIAGNOSIS — M19.041 PRIMARY OSTEOARTHRITIS, RIGHT HAND: ICD-10-CM

## 2019-08-07 DIAGNOSIS — M19.042 PRIMARY OSTEOARTHRITIS, LEFT HAND: ICD-10-CM

## 2019-08-07 PROCEDURE — 99214 OFFICE O/P EST MOD 30 MIN: CPT

## 2019-08-07 NOTE — PHYSICAL EXAM
[de-identified] : \par Previous AP, lateral and oblique radiographs of both hands demonstrated her bilateral basal joint arthroplasties, with subsidence.  There is advanced arthritis at the left ring finger PIP joint, as well as arthritis at the DIP joints of the digits and narrowing of the PIP joints of the other digits. [de-identified] : - Constitutional: This is a female in no obvious distress.  \par - Psych: Patient is alert and oriented to person, place and time.  Patient has a normal mood and affect.\par - Cardiovascular: Normal pulses throughout the upper extremities.  No significant varicosities are noted in the upper extremities. \par - Neuro: Strength and sensation are intact throughout the upper extremities.  Patient has normal coordination.\par - Respiratory:  Patient exhibits no evidence of shortness of breath or difficulty breathing.\par - Skin: No rashes, lesions, or other abnormalities are noted in the upper extremities.\par \par ---\par \par Examination of both hands demonstrate well-healed basal joint arthroplasty incisions.  She has more notable subsidence on the left side with some tenderness.  There is no further swelling or tenderness along the left thumb A1 pulley.  There is minimal residual triggering.  There is no triggering of the other digits.  She has arthritis at the DIP joints and obvious arthritis at the left ring finger PIP joint.  She has tenderness along the left ring finger PIP joint with limited motion.  She has a 20° PIP joint flexion contracture with approximately 70° of flexion.  She is neurovascularly intact distally.

## 2019-08-07 NOTE — DISCUSSION/SUMMARY
[FreeTextEntry1] : Further treatment options / recommendations were discussed.  Although there is minimal residual triggering at the left thumb, she no longer has pain or symptoms.  I therefore, recommended observation.  With regard to the left ring finger PIP joint, she has arthritis.  Although she does have some pain, she is more bothered by the stiffness.  I therefore, recommended observation.  If her symptoms worsen at the finger, I told her that we could possibly try a cortisone injection.\par \par I had a discussion regarding today's visit, the diagnosis, and my treatment recommendations.  The patient has agreed to this plan of management and has expressed full understanding.  All questions were fully answered to the patient's satisfaction.\par \par I spent at least 25 minutes of face-to-face time with the patient.  Over 50% of this time was spent on counseling the patient on the above diagnosis, treatment plan and prognosis.

## 2019-08-07 NOTE — HISTORY OF PRESENT ILLNESS
[FreeTextEntry1] : One month status post left trigger thumb cortisone injection #1. She also has left ring finger PIP joint pain secondary to arthritis\par \par She is doing well.  She no pain at the left thumb.  She does have some residual pain, and stiffness at the left ring finger.

## 2019-08-12 ENCOUNTER — APPOINTMENT (OUTPATIENT)
Dept: ORTHOPEDIC SURGERY | Facility: CLINIC | Age: 74
End: 2019-08-12
Payer: MEDICARE

## 2019-08-12 PROCEDURE — 99214 OFFICE O/P EST MOD 30 MIN: CPT

## 2019-08-12 PROCEDURE — 73502 X-RAY EXAM HIP UNI 2-3 VIEWS: CPT | Mod: RT

## 2019-10-03 NOTE — H&P PST ADULT - PHYSICIAN'S ASSESSMENT OF RISK
Plan:  1) Amoxicillin twice daily x 10 days; take with food.   2) Diclofenac as needed for pain control. Add Tramadol for moderate to severe pain. No driving with this medication.   3)  dental paste, clove oil to help with additional pain relief/dry socket  4) Follow up with dentist ASAP - see handout.   
Low Risk

## 2020-07-30 ENCOUNTER — APPOINTMENT (OUTPATIENT)
Dept: ORTHOPEDIC SURGERY | Facility: CLINIC | Age: 75
End: 2020-07-30
Payer: MEDICARE

## 2020-07-30 PROCEDURE — 72100 X-RAY EXAM L-S SPINE 2/3 VWS: CPT

## 2020-07-30 PROCEDURE — 73562 X-RAY EXAM OF KNEE 3: CPT | Mod: 50

## 2020-07-30 PROCEDURE — 99214 OFFICE O/P EST MOD 30 MIN: CPT

## 2020-07-30 PROCEDURE — 72170 X-RAY EXAM OF PELVIS: CPT

## 2020-07-30 NOTE — ADDENDUM
[FreeTextEntry1] : I, Elkin Matthews, acted solely as a scribe for Dr. Hussein Carlin on this date 07/30/2020.\par All medical record entries made by the Scribe were at my, Dr. Hussein Carlin, direction and personally dictated by me on 07/30/2020. I have reviewed the chart and agree that the record accurately reflects my personal performance of the history, physical exam, assessment and plan. I have also personally directed, reviewed, and agreed with the chart.

## 2020-07-30 NOTE — PHYSICAL EXAM
[de-identified] : Constitutional\par o Appearance : well-nourished, well developed, alert, in no acute distress \par Head and Face\par o Head :\par ¦ Inspection : atraumatic, normocephalic\par o Face :\par ¦ Inspection : no visible rash or discoloration\par Respiratory\par o Respiratory Effort: breathing unlabored \par Neurologic\par o Mental Status Examination :\par ¦ Orientation : alert and oriented X 3\par Psychiatric\par o Mood and Affect: mood normal, affect appropriate\par Cardiovascular\par o Observation/Palpation : - no swelling\par Lymphatic\par o Additional Nodes : No palpable lymph nodes present\par \par Lumbosacral Spine\par o Inspection : no visible rash or discoloration\par o Palpation : no paraspinal musculature tenderness\par o Range of Motion : extension causes pain in the right thigh, sidebending causes no discomfort, rotation to the right causes discomfort\par o Muscle Strength : paraspinal muscle strength and tone within normal limits\par o Muscle Tone : paraspinal muscle strength and tone within normal limits\par Tests: straight leg test negative and GINGER test negative bilaterally\par \par Right Lower Extremity\par o Buttock : no tenderness, swelling or deformities\par o Right Hip :\par ¦ Inspection/Palpation : IT band tenderness, no swelling or deformities, well-healed incision\par ¦ Range of Motion : full and painless in all planes, no crepitance\par ¦ Stability : joint stability intact\par ¦ Strength : extension, flexion, adduction, abduction, internal rotation and external rotation 4-/5 \par Tests: Kelley’s test negative\par \par o Right Knee :\par ¦ Inspection/Palpation : mild medial and lateral compartment tenderness tenderness to palpation, no swelling\par ¦ Range of Motion : active flexion and extension full with mild pain, no crepitance\par ¦ Stability : no valgus or varus instability present on provocative testing\par ¦ Strength : flexion and extension 4-/5\par ¦ Tests and Signs : negative Anterior Drawer, negative Lachman, negative Charles \par \par Left Lower Extremity\par o Buttock : no tenderness, swelling or deformities \par o Left Hip :\par ¦ Inspection/Palpation : no tenderness, no swelling or deformities\par ¦ Range of Motion : full and painless in all planes, no crepitance\par ¦ Stability : joint stability intact\par ¦ Strength : extension, flexion, adduction, abduction, internal rotation and external rotation 4-/5\par Tests: Kelley’s test negative\par \par o Left Knee :\par ¦ Inspection/Palpation : medial compartment tenderness to palpation, no swelling\par ¦ Range of Motion : active flexion and extension full and painless, no crepitance\par ¦ Stability : no valgus or varus instability present on provocative testing\par ¦ Strength : flexion and extension 4-/5\par ¦ Tests and Signs : negative Anterior Drawer, negative Lachman, negative Charles \par \par Gait: ambulates with a cane, no significant extremity swelling or lymphedema\par \par Radiology Results \par o Lumbar Spine: AP and lateral views were obtained and revealed right lumbar scoliosis with severe diffuse degenerative disk disease worse at L3/L4 and diffuse facet arthropathy with foraminal stenosis at L5/S1.\par o Hip and Pelvis: AP pelvis and lateral of right hip are obtained and reveal excellent positioning of her right total hip replacement, moderate arthritis of the left hip, no lytic lesions.\par o Right Knee: Standing AP, lateral and tunnel views were obtained and revealed moderate tricompartmental osteoarthritis\par o Left Knee : Standing AP,lateral and tunnel views of the knee were obtained and revealed significant medial and patellofemoral arthritis.

## 2020-07-30 NOTE — HISTORY OF PRESENT ILLNESS
[de-identified] : 74 year old female presents complaining of low back and right leg pain. She is s/p right total hip replacement done on 12/5/2017 by Dr. Sutton. She notes that her back bothers her fairly regularly and radiates into her right leg. She does have numbness and tingling into her toes. She complains of cramps in both calves when she wakes up in the mornings. She denies pain in her right groin. She does have right knee pain, as well. She is known to have arthritis in her right knee and has had visco supplementation in the past, which helped. She has pain when walking and when going up and down stairs. She does feel her legs are weak. She has been taking Advil to manage her pain, which helps. She is known to have osteoporosis and is on medication for it.  She has had Visco supplementation in the past which has helped but she is not sure if the knees are the issue here

## 2020-09-14 ENCOUNTER — APPOINTMENT (OUTPATIENT)
Dept: ORTHOPEDIC SURGERY | Facility: CLINIC | Age: 75
End: 2020-09-14
Payer: MEDICARE

## 2020-09-14 PROCEDURE — 99214 OFFICE O/P EST MOD 30 MIN: CPT

## 2020-10-19 ENCOUNTER — APPOINTMENT (OUTPATIENT)
Dept: ORTHOPEDIC SURGERY | Facility: CLINIC | Age: 75
End: 2020-10-19
Payer: MEDICARE

## 2020-10-19 PROCEDURE — 99214 OFFICE O/P EST MOD 30 MIN: CPT

## 2020-10-19 PROCEDURE — 99072 ADDL SUPL MATRL&STAF TM PHE: CPT

## 2020-12-07 ENCOUNTER — APPOINTMENT (OUTPATIENT)
Dept: ORTHOPEDIC SURGERY | Facility: CLINIC | Age: 75
End: 2020-12-07
Payer: MEDICARE

## 2020-12-07 PROCEDURE — 99213 OFFICE O/P EST LOW 20 MIN: CPT

## 2020-12-07 PROCEDURE — 99072 ADDL SUPL MATRL&STAF TM PHE: CPT

## 2021-02-08 ENCOUNTER — APPOINTMENT (OUTPATIENT)
Dept: ORTHOPEDIC SURGERY | Facility: CLINIC | Age: 76
End: 2021-02-08

## 2021-08-30 NOTE — H&P PST ADULT - VISION (WITH CORRECTIVE LENSES IF THE PATIENT USUALLY WEARS THEM):
Normal vision: sees adequately in most situations; can see medication labels, newsprint
No Decelerations

## 2022-10-26 ENCOUNTER — APPOINTMENT (OUTPATIENT)
Dept: ORTHOPEDIC SURGERY | Facility: CLINIC | Age: 77
End: 2022-10-26

## 2022-10-26 PROCEDURE — 72100 X-RAY EXAM L-S SPINE 2/3 VWS: CPT

## 2022-10-26 PROCEDURE — 72170 X-RAY EXAM OF PELVIS: CPT

## 2022-10-26 PROCEDURE — 73564 X-RAY EXAM KNEE 4 OR MORE: CPT | Mod: 50

## 2022-10-26 PROCEDURE — 99214 OFFICE O/P EST MOD 30 MIN: CPT

## 2022-10-26 RX ORDER — CHOLECALCIFEROL (VITAMIN D3) 1250 MCG
1.25 MG TABLET ORAL
Qty: 6 | Refills: 0 | Status: ACTIVE | COMMUNITY
Start: 2022-08-31

## 2022-10-26 RX ORDER — HYALURONATE SODIUM, STABILIZED 88 MG/4 ML
88 SYRINGE (ML) INTRAARTICULAR
Qty: 2 | Refills: 0 | Status: ACTIVE | COMMUNITY
Start: 2022-10-26

## 2022-10-26 RX ORDER — ALENDRONATE SODIUM 70 MG/1
70 TABLET ORAL
Qty: 12 | Refills: 0 | Status: ACTIVE | COMMUNITY
Start: 2021-11-04

## 2022-11-30 ENCOUNTER — APPOINTMENT (OUTPATIENT)
Dept: ORTHOPEDIC SURGERY | Facility: CLINIC | Age: 77
End: 2022-11-30
Payer: MEDICARE

## 2022-11-30 PROCEDURE — 20611 DRAIN/INJ JOINT/BURSA W/US: CPT | Mod: RT

## 2022-11-30 PROCEDURE — 99213 OFFICE O/P EST LOW 20 MIN: CPT | Mod: 25

## 2022-12-14 ENCOUNTER — APPOINTMENT (OUTPATIENT)
Dept: ORTHOPEDIC SURGERY | Facility: CLINIC | Age: 77
End: 2022-12-14

## 2022-12-14 PROCEDURE — 20611 DRAIN/INJ JOINT/BURSA W/US: CPT | Mod: LT

## 2023-02-08 ENCOUNTER — APPOINTMENT (OUTPATIENT)
Dept: ORTHOPEDIC SURGERY | Facility: CLINIC | Age: 78
End: 2023-02-08
Payer: MEDICARE

## 2023-02-08 PROCEDURE — 99214 OFFICE O/P EST MOD 30 MIN: CPT

## 2023-04-18 NOTE — PHYSICAL THERAPY INITIAL EVALUATION ADULT - SITTING BALANCE: DYNAMIC
Attempted to call patient however no answer and unable to LVM.  Dr. Shashi Connors recommended at apt that she see GI. SHe can see whatever GI doctor she would like as long as they take her insurance fair minus

## 2023-07-19 ENCOUNTER — APPOINTMENT (OUTPATIENT)
Dept: ORTHOPEDIC SURGERY | Facility: CLINIC | Age: 78
End: 2023-07-19
Payer: MEDICARE

## 2023-07-19 VITALS — WEIGHT: 145 LBS | BODY MASS INDEX: 26.68 KG/M2 | HEIGHT: 62 IN

## 2023-07-19 PROCEDURE — 99214 OFFICE O/P EST MOD 30 MIN: CPT

## 2023-07-19 RX ORDER — HYALURONATE SODIUM, STABILIZED 88 MG/4 ML
88 SYRINGE (ML) INTRAARTICULAR
Qty: 2 | Refills: 0 | Status: ACTIVE | COMMUNITY
Start: 2023-07-19

## 2023-07-19 NOTE — DISCUSSION/SUMMARY
[de-identified] : I went over the pathophysiology of the patient's symptoms in great detail with the patient. We referred her to Dr. Terry to have an evaluation of the right foot hammer toe. I am recommending the patient continues to go to physical therapy to obtain increases in their strength and mobility. A prescription was provided. At-home strengthening exercises were discussed and demonstrated with the patient. Viscosupplementation was discussed as a solution to the patient's symptoms and we are requesting authorization. Proper cane walking protocol was discussed and demonstrated with the patient. \par \par All of their questions were answered. They understand and consent to the plan. \par \par FU in 4-6 weeks.  Render Risk Assessment In Note?: no

## 2023-07-19 NOTE — PHYSICAL EXAM
[de-identified] : Constitutional\par o Appearance : well-nourished, well developed, alert, in no acute distress \par Head and Face\par o Head :\par ¦ Inspection : atraumatic, normocephalic\par o Face :\par ¦ Inspection : no visible rash or discoloration\par Respiratory\par o Respiratory Effort: breathing unlabored \par Neurologic\par o Mental Status Examination :\par ¦ Orientation : alert and oriented X 3\par Psychiatric\par o Mood and Affect: mood normal, affect appropriate\par Cardiovascular\par o Observation/Palpation : - no swelling\par Lymphatic\par o Additional Nodes : No palpable lymph nodes present\par \par Lumbosacral Spine\par o Inspection : no visible rash or discoloration, scoliosis \par o Palpation : no paraspinal musculature tenderness, right sciatic notch tenderness, right SI joint tenderness \par o Range of Motion : sidebending causes right paralumbar discomfort, rotation to the left causes lower lumbar discomfort \par o Muscle Strength : paraspinal muscle strength and tone within normal limits\par o Muscle Tone : paraspinal muscle strength and tone within normal limits\par o Tests: straight leg test negative and GINGER test negative bilaterally\par  \par Right Lower Extremity\par o Buttock : no tenderness, swelling or deformities\par o Right Hip :\par ¦ Inspection/Palpation : greater trochanteric tenderness, no swelling or deformities\par ¦ Range of Motion : full flexion and rotation with mild discomfort on full ER, no crepitance\par ¦ Stability : joint stability intact\par ¦ Strength : extension, flexion 4-/5, adduction, abduction, internal rotation and external rotation\par ¦ Tests: Kelley’s test negative\par \par o Right Knee :\par ¦ Inspection/Palpation : medial and lateral facet tenderness to palpation, minimal swelling, varus deformity R>L\par ¦ Range of Motion : 0-140° painless, patellofemoral crepitance, good patellofemoral glide \par ¦ Stability : no valgus or varus instability present on provocative testing\par ¦ Strength : flexion and extension 4+/5\par ¦ Tests and Signs : negative Anterior Drawer, negative Lachman, negative Charles \par \par Left Lower Extremity\par o Buttock : no tenderness, swelling or deformities \par o Left Hip :\par ¦ Inspection/Palpation : greater trochanteric tenderness, no swelling or deformities\par ¦ Range of Motion : full and painless in all planes, no crepitance\par ¦ Stability : joint stability intact\par ¦ Strength : extension, flexion, adduction, abduction, internal rotation and external rotation, 5/5\par ¦ Tests: Kelley’s test negative\par \par o Left Knee :\par ¦ Inspection/Palpation : medial compartment tenderness to palpation, no lateral compartment tenderness, minimal swelling, small Baker's cyst, varus deformity R>L\par ¦ Range of Motion : 0-140° painless, no crepitance, good patellofemoral glide \par ¦ Stability : no valgus or varus instability present on provocative testing\par ¦ Strength : flexion and extension 4-/5\par ¦ Tests and Signs : negative Anterior Drawer, negative Lachman, negative Charles \par \par Gait: ambulating with a cane in the right hand, varus deformity R>L knees, no significant extremity swelling or lymphedema, right leg 0.5 inch longer than left leg, improved  core strength, tight hamstrings

## 2023-07-19 NOTE — ADDENDUM
[FreeTextEntry1] : I, DOUGLASMICHELLE STERLING, acted solely as a scribe for Dr. Hussein Carlin on this date 07/19/2023.\par \par All medical record entries made by the Scribe were at my, Dr. Hussein Carlin, direction and personally dictated by me on 07/19/2023. I have reviewed the chart and agree that the record accurately reflects my personal performance of the history, physical exam, assessment and plan. I have also personally directed, reviewed, and agreed with the chart.

## 2023-07-19 NOTE — HISTORY OF PRESENT ILLNESS
[de-identified] : 77 year old female presents for follow-up evaluation of the left knee. She has been attending PT and notes some progress. She notes pain in her lower back and that radiates to the right thigh. She notes pain in the right knee. She also has a hammer toe.  He is interested in treating the gel shots for her knees as she feels they have helped dramatically.  She notes no buckling or giving out although her right knee is starting to act up a bit.\par PSHx- She is s/p right THR done 12/5/2017 by Dr. Cortez.\par PMHx- Denies a hx of scoliosis as a child. Notes allergies to Tylenol and Keflex. Denies a PCN allergy. \par \par Radiology Results from 10/26/2022:\par o Lumbosacral Spine : AP and lateral views were obtained and reveal a degenerative scoliosis with diffuse DDD worse at L3-4. \par o Pelvis : AP pelvis was obtained and reveal a right THR in good position. Moderate arthritis of the left hip. Left SI joint arthritis. \par o Right Knee : Standing AP, lateral, tunnel, and skyline views of the knee were obtained and reveal moderate tricompartmental osteoarthritis worse in the medial compartment. No lytic lesions. \par o Left Knee : Standing AP, lateral, tunnel, and skyline views of the knee were obtained and reveal moderate tricompartmental osteoarthritis worse in the medial compartment. No lytic lesions.

## 2023-07-20 ENCOUNTER — FORM ENCOUNTER (OUTPATIENT)
Age: 78
End: 2023-07-20

## 2023-08-02 ENCOUNTER — APPOINTMENT (OUTPATIENT)
Dept: ORTHOPEDIC SURGERY | Facility: CLINIC | Age: 78
End: 2023-08-02
Payer: MEDICARE

## 2023-08-02 VITALS — HEIGHT: 62 IN | WEIGHT: 145 LBS | BODY MASS INDEX: 26.68 KG/M2

## 2023-08-02 PROCEDURE — 99213 OFFICE O/P EST LOW 20 MIN: CPT | Mod: 25

## 2023-08-02 PROCEDURE — 20611 DRAIN/INJ JOINT/BURSA W/US: CPT | Mod: RT

## 2023-08-02 NOTE — DISCUSSION/SUMMARY
[de-identified] : I went over the pathophysiology of the patient's symptoms in great detail with the patient. The patient elected to receive a Synvisc-One  injection into her right knee today, and tolerated it well. I instructed the patient on ROM exercises, and told them to take it easy. The use of ice and rest was reviewed with the patient. The patient may resume activities tomorrow. I reminded the patient that it takes 4 to 6 weeks after the final injection to feel symptom relief.  All of their questions were answered. They understand and consent to the plan.   FU next week for a left knee Synvisc-One injection.
Euthymic
Irritable/Depressed
Irritable/Depressed
Euthymic

## 2023-08-02 NOTE — PHYSICAL EXAM
[de-identified] : Constitutional o Appearance : well-nourished, well developed, alert, in no acute distress  Head and Face o Head :  Inspection : atraumatic, normocephalic o Face :  Inspection : no visible rash or discoloration Respiratory o Respiratory Effort: breathing unlabored  Neurologic o Mental Status Examination :  Orientation : alert and oriented X 3 Psychiatric o Mood and Affect: mood normal, affect appropriate Cardiovascular o Observation/Palpation : - no swelling Lymphatic o Additional Nodes : No palpable lymph nodes present  Lumbosacral Spine o Inspection : no visible rash or discoloration, scoliosis  o Palpation : no paraspinal musculature tenderness, right sciatic notch tenderness, right SI joint tenderness  o Range of Motion : sidebending causes right paralumbar discomfort, rotation to the left causes lower lumbar discomfort  o Muscle Strength : paraspinal muscle strength and tone within normal limits o Muscle Tone : paraspinal muscle strength and tone within normal limits o Tests: straight leg test negative and GINGER test negative bilaterally   Right Lower Extremity o Buttock : no tenderness, swelling or deformities o Right Hip :  Inspection/Palpation : greater trochanteric tenderness, no swelling or deformities  Range of Motion : full flexion and rotation with mild discomfort on full ER, no crepitance  Stability : joint stability intact  Strength : extension, flexion 4-/5, adduction, abduction, internal rotation and external rotation  Tests: Kelley's test negative  o Right Knee :  Inspection/Palpation : medial and lateral facet tenderness to palpation, no swelling, varus deformity R>L  Range of Motion : 0-125 painless, patellofemoral crepitance, good patellofemoral glide   Stability : no valgus or varus instability present on provocative testing  Strength : flexion and extension 4+/5  Tests and Signs : negative Anterior Drawer, negative Lachman, negative Charles   Left Lower Extremity o Buttock : no tenderness, swelling or deformities  o Left Hip :  Inspection/Palpation : greater trochanteric tenderness, no swelling or deformities  Range of Motion : full and painless in all planes, no crepitance  Stability : joint stability intact  Strength : extension, flexion, adduction, abduction, internal rotation and external rotation, 5/5  Tests: Kelley's test negative  o Left Knee :  Inspection/Palpation : medial compartment tenderness to palpation, no lateral compartment tenderness, minimal swelling, small Baker's cyst, varus deformity R>L  Range of Motion : 0-140 painless, no crepitance, good patellofemoral glide   Stability : no valgus or varus instability present on provocative testing  Strength : flexion and extension 4-/5  Tests and Signs : negative Anterior Drawer, negative Lachman, negative Charles   Gait: ambulating with a cane, varus deformity R>L knees, no significant extremity swelling or lymphedema, right leg 0.5 inch longer than left leg, improved  core strength, tight hamstrings  o Knee injection : Indication- right knee osteoarthritis, Anatomic location- right intra-articular joint space, Spray - area was sterilized with Betadine and alcohol and anesthetized with Ethyl Chloride , needle used-20G, Medications given- 6cc's Synvisc-One under Ultrasound guidance. The patient tolerated the procedure well. Lot#[id number] Exp#[id number]

## 2023-08-02 NOTE — HISTORY OF PRESENT ILLNESS
Patient still awaiting approval for Morphine rx.  I checked with Pharmacy and [facility] de-identified] : 77 year old female presents for follow-up evaluation of the left knee. She denies any allergies to chicken eggs or feathers. She denies any swelling or buckling.  PSHx- She is s/p right THR done 12/5/2017 by Dr. Cortez. PMHx- Denies a hx of scoliosis as a child. Notes allergies to Tylenol and Keflex. Denies a PCN allergy.   Radiology Results from 10/26/2022: o Lumbosacral Spine : AP and lateral views were obtained and reveal a degenerative scoliosis with diffuse DDD worse at L3-4.  o Pelvis : AP pelvis was obtained and reveal a right THR in good position. Moderate arthritis of the left hip. Left SI joint arthritis.  o Right Knee : Standing AP, lateral, tunnel, and skyline views of the knee were obtained and reveal moderate tricompartmental osteoarthritis worse in the medial compartment. No lytic lesions.  o Left Knee : Standing AP, lateral, tunnel, and skyline views of the knee were obtained and reveal moderate tricompartmental osteoarthritis worse in the medial compartment. No lytic lesions.

## 2023-08-09 ENCOUNTER — APPOINTMENT (OUTPATIENT)
Dept: ORTHOPEDIC SURGERY | Facility: CLINIC | Age: 78
End: 2023-08-09
Payer: MEDICARE

## 2023-08-09 PROCEDURE — 20611 DRAIN/INJ JOINT/BURSA W/US: CPT | Mod: LT

## 2023-08-09 NOTE — HISTORY OF PRESENT ILLNESS
[de-identified] : 77-year-old female presents for a left knee Synvisc-One today 08/09/2023. She had a right knee Synvisc-One injection on 08/02/2023. She reports she is having some weakness in the right knee. She denies any allergies to chicken eggs or feathers.  PSHx- She is s/p right THR done 12/5/2017 by Dr. Cortez. PMHx- Denies a hx of scoliosis as a child. Notes allergies to Tylenol and Keflex. Denies a PCN allergy.   Radiology Results from 10/26/2022: o Lumbosacral Spine : AP and lateral views were obtained and reveal a degenerative scoliosis with diffuse DDD worse at L3-4.  o Pelvis : AP pelvis was obtained and reveal a right THR in good position. Moderate arthritis of the left hip. Left SI joint arthritis.  o Right Knee : Standing AP, lateral, tunnel, and skyline views of the knee were obtained and reveal moderate tricompartmental osteoarthritis worse in the medial compartment. No lytic lesions.  o Left Knee : Standing AP, lateral, tunnel, and skyline views of the knee were obtained and reveal moderate tricompartmental osteoarthritis worse in the medial compartment. No lytic lesions.

## 2023-08-09 NOTE — PHYSICAL EXAM
[de-identified] : Constitutional o Appearance : well-nourished, well developed, alert, in no acute distress  Head and Face o Head :  Inspection : atraumatic, normocephalic o Face :  Inspection : no visible rash or discoloration Respiratory o Respiratory Effort: breathing unlabored  Neurologic o Mental Status Examination :  Orientation : alert and oriented X 3 Psychiatric o Mood and Affect: mood normal, affect appropriate Cardiovascular o Observation/Palpation : - no swelling Lymphatic o Additional Nodes : No palpable lymph nodes present  Lumbosacral Spine o Inspection : no visible rash or discoloration, scoliosis  o Palpation : no paraspinal musculature tenderness, right sciatic notch tenderness, right SI joint tenderness  o Range of Motion : sidebending causes right paralumbar discomfort, rotation to the left causes lower lumbar discomfort  o Muscle Strength : paraspinal muscle strength and tone within normal limits o Muscle Tone : paraspinal muscle strength and tone within normal limits o Tests: straight leg test negative and GINGER test negative bilaterally   Right Lower Extremity o Buttock : no tenderness, swelling or deformities o Right Hip :  Inspection/Palpation : greater trochanteric tenderness, no swelling or deformities  Range of Motion : full flexion and rotation with mild discomfort on full ER, no crepitance  Stability : joint stability intact  Strength : extension, flexion 4-/5, adduction, abduction, internal rotation and external rotation  Tests: Kelley's test negative  o Right Knee :  Inspection/Palpation : medial and lateral facet tenderness to palpation, no swelling, varus deformity R>L  Range of Motion : 0-125 painless, patellofemoral crepitance, good patellofemoral glide   Stability : no valgus or varus instability present on provocative testing  Strength : flexion and extension 4+/5  Tests and Signs : negative Anterior Drawer, negative Lachman, negative Charles   Left Lower Extremity o Buttock : no tenderness, swelling or deformities  o Left Hip :  Inspection/Palpation : greater trochanteric tenderness, no swelling or deformities  Range of Motion : full and painless in all planes, no crepitance  Stability : joint stability intact  Strength : extension, flexion, adduction, abduction, internal rotation and external rotation, 5/5  Tests: Kelley's test negative  o Left Knee :  Inspection/Palpation : medial compartment tenderness to palpation, no lateral compartment tenderness, minimal swelling, small Baker's cyst, varus deformity R>L  Range of Motion : 0-130 painless, no crepitance, good patellofemoral glide   Stability : no valgus or varus instability present on provocative testing  Strength : flexion and extension 5/5  Tests and Signs : negative Anterior Drawer, negative Lachman, negative Charles   Gait: ambulating with a cane, varus deformity R>L knees, no significant extremity swelling or lymphedema, right leg 0.5 inch longer than left leg, improved  core strength, tight hamstrings  o Knee injection : Indication- Left knee osteoarthritis, Anatomic location- Left intra-articular joint space, Spray - area was sterilized with Betadine and alcohol and anesthetized with Ethyl Chloride , needle used-20G, Medications given- 6cc's Synvisc-One under Ultrasound guidance. The patient tolerated the procedure well. Lot#[id number] Exp#2026-02

## 2023-08-09 NOTE — DISCUSSION/SUMMARY
[de-identified] : I went over the pathophysiology of the patient's symptoms in great detail with the patient. The patient elected to receive a Synvisc-One injection into her left knee today and tolerated it well. I instructed the patient on ROM exercises and told them to take it easy. The use of ice and rest was reviewed with the patient. The patient may resume activities tomorrow. I reminded the patient that it takes 4 to 6 weeks after the final injection to feel symptom relief.  All of their questions were answered. They understand and consent to the plan.   FU in 6 weeks.

## 2023-10-04 ENCOUNTER — APPOINTMENT (OUTPATIENT)
Dept: ORTHOPEDIC SURGERY | Facility: CLINIC | Age: 78
End: 2023-10-04
Payer: MEDICARE

## 2023-10-04 VITALS — HEIGHT: 62 IN | BODY MASS INDEX: 26.87 KG/M2 | WEIGHT: 146 LBS

## 2023-10-04 DIAGNOSIS — M41.50 OTHER SECONDARY SCOLIOSIS, SITE UNSPECIFIED: ICD-10-CM

## 2023-10-04 DIAGNOSIS — M47.816 SPONDYLOSIS W/OUT MYELOPATHY OR RADICULOPATHY, LUMBAR REGION: ICD-10-CM

## 2023-10-04 PROCEDURE — 73564 X-RAY EXAM KNEE 4 OR MORE: CPT | Mod: 50

## 2023-10-04 PROCEDURE — 72100 X-RAY EXAM L-S SPINE 2/3 VWS: CPT

## 2023-10-04 PROCEDURE — 99214 OFFICE O/P EST MOD 30 MIN: CPT

## 2023-11-22 ENCOUNTER — APPOINTMENT (OUTPATIENT)
Dept: ORTHOPEDIC SURGERY | Facility: CLINIC | Age: 78
End: 2023-11-22

## 2023-12-20 NOTE — REASON FOR VISIT
[Follow-Up Visit] : a follow-up visit for [Knee Pain] : knee pain [FreeTextEntry2] : low back pain [de-identified] : Jamir presents as a very serious young man Flat facial expressions even when discussing sad events in family Elaborates on statements with minimal prompting Rate of speech was very fast and unintelligible at times Eye contact inconsistent

## 2024-05-07 ENCOUNTER — APPOINTMENT (OUTPATIENT)
Dept: ORTHOPEDIC SURGERY | Facility: CLINIC | Age: 79
End: 2024-05-07
Payer: MEDICARE

## 2024-05-07 VITALS — BODY MASS INDEX: 25.16 KG/M2 | WEIGHT: 142 LBS | HEIGHT: 63 IN

## 2024-05-07 DIAGNOSIS — M41.25 OTHER IDIOPATHIC SCOLIOSIS, THORACOLUMBAR REGION: ICD-10-CM

## 2024-05-07 DIAGNOSIS — M48.062 SPINAL STENOSIS, LUMBAR REGION WITH NEUROGENIC CLAUDICATION: ICD-10-CM

## 2024-05-07 PROCEDURE — 99214 OFFICE O/P EST MOD 30 MIN: CPT

## 2024-05-07 RX ORDER — HYALURONATE SODIUM, STABILIZED 88 MG/4 ML
88 SYRINGE (ML) INTRAARTICULAR
Qty: 2 | Refills: 0 | Status: ACTIVE | COMMUNITY
Start: 2024-05-07

## 2024-05-07 NOTE — DISCUSSION/SUMMARY
[de-identified] : I went over the pathophysiology of the patient's symptoms in great detail with the patient. I discussed the underlying pathophysiology of the patient's condition in great detail with the patient. I went over the patient's x-rays with them in great detail. At-home strengthening exercises were discussed and demonstrated in great detail with the patient, with an exercise sheet being provided. Viscosupplementation was discussed as a solution to the patient's symptoms, and we are requesting Monovisc for both knees. Proper cane walking protocol was discussed and demonstrated with the patient.   All of their questions were answered. They understand and consent to the plan.   FU after authorization.

## 2024-05-07 NOTE — ADDENDUM
[FreeTextEntry1] : I, DOUGLAS STERLING, acted solely as a scribe for Dr. Hussein Carlin on this date 05/07/2024.  All medical record entries made by the Scribe were at my, Dr. Hussein Carlin, direction and personally dictated by me on 05/07/2024. I have reviewed the chart and agree that the record accurately reflects my personal performance of the history, physical exam, assessment and plan. I have also personally directed, reviewed, and agreed with the chart.

## 2024-05-07 NOTE — HISTORY OF PRESENT ILLNESS
[de-identified] : 77-year-old female presents for a follow-up bilateral knee. She notes she is getting a lot of knee pain at this time. She notes the right knee is worse than the left. She is due for gel injections. She is also complaining of right sided lower back pain. She notes she cannot go to physical therapy at this time. Patient denies that she has any numbness or tingling. She denies radiating pain down her legs. She is s/p right THR done 12/5/2017 by Dr. Cortez. PMHx- Notes allergies to Tylenol and Keflex. Denies a PCN allergy.   Radiology Results 10/04/2023 o Lumbosacral Spine : AP and lateral views were obtained and revealed degenerative scoliosis degenerative disc disease with facet arthropathy and foraminal stenosis L5-S1, essential a fusion of L3-4 and right hip replacement  o Right Knee : Standing AP, lateral and tunnel views of the knee were obtained and revealed severe medial and moderate patellofemoral arthritis  o Left Knee : Standing AP, lateral and tunnel views of the knee were obtained and revealed severe medial and moderate patellofemoral arthritis   Radiology Results from 10/26/2022: o Lumbosacral Spine : AP and lateral views were obtained and reveal a degenerative scoliosis with diffuse DDD worse at L3-4.  o Pelvis : AP pelvis was obtained and reveal a right THR in good position. Moderate arthritis of the left hip. Left SI joint arthritis.  o Right Knee : Standing AP, lateral, tunnel, and skyline views of the knee were obtained and reveal moderate tricompartmental osteoarthritis worse in the medial compartment. No lytic lesions.  o Left Knee : Standing AP, lateral, tunnel, and skyline views of the knee were obtained and reveal moderate tricompartmental osteoarthritis worse in the medial compartment. No lytic lesions.

## 2024-05-07 NOTE — PHYSICAL EXAM
[de-identified] : Constitutional o Appearance : well-nourished, well developed, alert, in no acute distress  Head and Face o Head :  Inspection : atraumatic, normocephalic o Face :  Inspection : no visible rash or discoloration Respiratory o Respiratory Effort: breathing unlabored  Neurologic o Mental Status Examination :  Orientation : alert and oriented X 3 Psychiatric o Mood and Affect: mood normal, affect appropriate Cardiovascular o Observation/Palpation : - no swelling Lymphatic o Additional Nodes : No palpable lymph nodes present  Lumbosacral Spine o Inspection : no visible rash or discoloration, scoliosis  o Palpation : right sciatic notch tenderness, right SI joint tenderness  o Range of Motion : sidebending causes right reproduces her symptoms, rotation to the right causes lower lumbar discomfort  o Muscle Strength : paraspinal muscle strength and tone within normal limits o Muscle Tone : paraspinal muscle strength and tone within normal limits o Tests: straight leg test negative and GINGER test negative bilaterally   Right Lower Extremity o Buttock : no tenderness, swelling or deformities o Right Hip :  Inspection/Palpation : greater trochanteric tenderness, no swelling or deformities  Range of Motion : full flexion and rotation, no crepitance  Stability : joint stability intact  Strength : extension, flexion, adduction, abduction, internal rotation and external rotation 5/5  Tests: Kelley's test negative  o Right Knee :  Inspection/Palpation : medial and lateral facet tenderness to palpation, no swelling, varus deformity R>L  Range of Motion : 0-130 painless, patellofemoral crepitance, good patellofemoral glide   Stability : no valgus or varus instability present on provocative testing  Strength : flexion and extension 5/5  Tests and Signs : negative Anterior Drawer, negative Lachman, negative Charles   Left Lower Extremity o Buttock : no tenderness, swelling or deformities  o Left Hip :  Inspection/Palpation : greater trochanteric tenderness, no swelling or deformities  Range of Motion : full and painless in all planes, no crepitance  Stability : joint stability intact  Strength : extension, flexion, adduction, abduction, internal rotation and external rotation, 4-/5  Tests: Kelley's test negative  o Left Knee :  Inspection/Palpation : medial compartment tenderness to palpation, no lateral compartment tenderness, no swelling, small Baker's cyst, varus deformity R>L  Range of Motion : 0-135 painless, no crepitance, decrease patellofemoral glide   Stability : no valgus or varus instability present on provocative testing  Strength : flexion and extension 5/5  Tests and Signs : negative Anterior Drawer, negative Lachman, negative Charles   Gait: varus deformity R>L knees, no significant extremity swelling or lymphedema, right leg 0.5 inch longer than left leg, limited core strength, tight hamstrings, limited core strength, Patient presents ambulating with a cane.

## 2024-06-25 ENCOUNTER — APPOINTMENT (OUTPATIENT)
Dept: ORTHOPEDIC SURGERY | Facility: CLINIC | Age: 79
End: 2024-06-25
Payer: MEDICARE

## 2024-06-25 VITALS — HEIGHT: 63 IN | WEIGHT: 142 LBS | BODY MASS INDEX: 25.16 KG/M2

## 2024-06-25 DIAGNOSIS — M16.11 UNILATERAL PRIMARY OSTEOARTHRITIS, RIGHT HIP: ICD-10-CM

## 2024-06-25 PROCEDURE — 99213 OFFICE O/P EST LOW 20 MIN: CPT | Mod: 25

## 2024-06-25 PROCEDURE — 20611 DRAIN/INJ JOINT/BURSA W/US: CPT | Mod: RT

## 2024-07-02 ENCOUNTER — APPOINTMENT (OUTPATIENT)
Dept: ORTHOPEDIC SURGERY | Facility: CLINIC | Age: 79
End: 2024-07-02
Payer: MEDICARE

## 2024-07-02 VITALS — BODY MASS INDEX: 25.16 KG/M2 | HEIGHT: 63 IN | WEIGHT: 142 LBS

## 2024-07-02 DIAGNOSIS — M75.101 UNSPECIFIED ROTATOR CUFF TEAR OR RUPTURE OF RIGHT SHOULDER, NOT SPECIFIED AS TRAUMATIC: ICD-10-CM

## 2024-07-02 DIAGNOSIS — M16.0 BILATERAL PRIMARY OSTEOARTHRITIS OF HIP: ICD-10-CM

## 2024-07-02 DIAGNOSIS — M19.011 PRIMARY OSTEOARTHRITIS, RIGHT SHOULDER: ICD-10-CM

## 2024-07-02 DIAGNOSIS — M17.0 BILATERAL PRIMARY OSTEOARTHRITIS OF KNEE: ICD-10-CM

## 2024-07-02 DIAGNOSIS — M12.811 UNSPECIFIED ROTATOR CUFF TEAR OR RUPTURE OF RIGHT SHOULDER, NOT SPECIFIED AS TRAUMATIC: ICD-10-CM

## 2024-07-02 DIAGNOSIS — M19.019 PRIMARY OSTEOARTHRITIS, UNSPECIFIED SHOULDER: ICD-10-CM

## 2024-07-02 PROCEDURE — 20611 DRAIN/INJ JOINT/BURSA W/US: CPT | Mod: LT

## 2024-07-02 PROCEDURE — 73030 X-RAY EXAM OF SHOULDER: CPT | Mod: 50

## 2024-07-02 PROCEDURE — 99214 OFFICE O/P EST MOD 30 MIN: CPT | Mod: 25

## 2024-08-15 ENCOUNTER — APPOINTMENT (OUTPATIENT)
Dept: ORTHOPEDIC SURGERY | Facility: CLINIC | Age: 79
End: 2024-08-15
Payer: MEDICARE

## 2024-08-15 VITALS — HEIGHT: 63 IN | BODY MASS INDEX: 25.16 KG/M2 | WEIGHT: 142 LBS

## 2024-08-15 DIAGNOSIS — M47.816 SPONDYLOSIS W/OUT MYELOPATHY OR RADICULOPATHY, LUMBAR REGION: ICD-10-CM

## 2024-08-15 DIAGNOSIS — M48.062 SPINAL STENOSIS, LUMBAR REGION WITH NEUROGENIC CLAUDICATION: ICD-10-CM

## 2024-08-15 PROCEDURE — 99214 OFFICE O/P EST MOD 30 MIN: CPT

## 2024-08-15 NOTE — PHYSICAL EXAM
[de-identified] : Constitutional o Appearance : well-nourished, well developed, alert, in no acute distress  Head and Face o Head :  Inspection : atraumatic, normocephalic o Face :  Inspection : no visible rash or discoloration Respiratory o Respiratory Effort: breathing unlabored  Neurologic o Mental Status Examination :  Orientation : alert and oriented X 3 Psychiatric o Mood and Affect: mood normal, affect appropriate Cardiovascular o Observation/Palpation : - no swelling Lymphatic o Additional Nodes : No palpable lymph nodes present  Cervical Spine o Inspection/Palpation :  Inspection : alignment midline, normal degree of lordosis present  Skin : normal appearance, no masses or tenderness, trachea midline  Palpation : musculature is nontender to palpation o Range of Motion : arc of motion full in all planes, no crepitance or pain with ROM o Tests: Negative Spurlings test   o Right Shoulder :  Inspection/Palpation : no tenderness, swelling or deformities  Range of Motion : full forward flexion, Full IR and ER,  no crepitance  Strength : forward elevation 4+/5, internal rotation 4+/5, external rotation 3+/5, adduction and abduction, supraspinatus 4+/5 , external rotation at 90 of abduction, biceps/triceps, 5/5  Stability : no joint instability on provocative testing   Tests: Cooper negative, Neer negative, Geronimo negative, negative drop arm test, Story's test negative   Lumbosacral Spine o Inspection : no visible rash or discoloration, scoliosis  o Palpation : right sciatic notch tenderness, right SI joint tenderness  o Range of Motion: side bending to the left and to the right cause discomfort, rotation to the right causes discomfort, side bending to the right and left, obvious curve, right and left SIJ tenderness, forward flexion causes lower back discomfort  o Muscle Strength : paraspinal muscle strength and tone within normal limits o Muscle Tone : paraspinal muscle strength and tone within normal limits o Tests: straight leg test negative and GINGER test negative bilaterally   Right Lower Extremity o Buttock : no tenderness, swelling or deformities o Right Hip :  Inspection/Palpation : greater trochanteric tenderness, no swelling or deformities  Range of Motion : full flexion and rotation, no crepitance  Stability : joint stability intact  Strength : extension, flexion, adduction, abduction, internal rotation and external rotation 4-/5  Tests: Kelley's test negative  o Right Knee :  Inspection/Palpation : medial and lateral facet tenderness to palpation, no swelling, varus deformity R>L  Range of Motion : 0-130 painless, patellofemoral crepitance, good patellofemoral glide   Stability : no valgus or varus instability present on provocative testing  Strength : flexion and extension 4-/5  Tests and Signs : negative Anterior Drawer, negative Lachman, negative Charles   Left Lower Extremity o Buttock : no tenderness, swelling or deformities  o Left Hip :  Inspection/Palpation : greater trochanteric tenderness, no swelling or deformities  Range of Motion : full and painless in all planes, no crepitance  Stability : joint stability intact  Strength : extension, flexion, adduction, abduction, internal rotation and external rotation, 4-/5  Tests: Kelley's test negative  o Left Knee :  Inspection/Palpation : medial compartment tenderness to palpation, no lateral compartment tenderness, no swelling, small Baker's cyst, varus deformity R>L  Range of Motion : 0-135 painless, no crepitance, decrease patellofemoral glide   Stability : no valgus or varus instability present on provocative testing  Strength : flexion and extension 5/5  Tests and Signs : negative Anterior Drawer, negative Lachman, negative Charles   Gait: varus deformity R>L knees, no significant extremity swelling or lymphedema, right leg 0.5 inch longer than left leg, limited core strength, tight hamstrings, limited core strength, Patient presents ambulating with a cane. list to the right when she walks, balance is poor, decrease in the L4 distribution on the right, limited core strength   Radiology Results 8/15/2024 o Lumbosacral Spine: AP and lateral views were obtained and revealed degenerative disc disease worse at L3-4 and L4-5 and foraminal stenosis at L4-5 and mild right degenerative lumbar spine  o Pelvis: AP pelvis was obtained and revealed right THR in good position and moderate degenerative arthritis of the left hip and sacroiliac joint

## 2024-08-15 NOTE — DISCUSSION/SUMMARY
[de-identified] : I went over the pathophysiology of the patient's symptoms in great detail with the patient. I discussed the underlying pathophysiology of the patient's condition in great detail with the patient. I went over the patient's x-rays with them in great detail. At this time, she will start a course of physical therapy for strengthening and flexibility. A prescription was provided. Proper cane walking protocol was discussed and demonstrated with the patient. We discussed the use of ice, Tylenol and anti-inflammatories to relieve pain.   All of their questions were answered. They understand and consent to the plan.   FU in one month. If she does not improve, we will consider an MRI upon her return.

## 2024-08-15 NOTE — ADDENDUM
[FreeTextEntry1] : I, DOUGLAS STERLING, acted solely as a scribe for Dr. Hussein Carlin on this date 08/15/2024.  All medical record entries made by the Scribe were at my, Dr. Hussein Carlin, direction and personally dictated by me on 08/15/2024. I have reviewed the chart and agree that the record accurately reflects my personal performance of the history, physical exam, assessment and plan. I have also personally directed, reviewed, and agreed with the chart.

## 2024-08-15 NOTE — HISTORY OF PRESENT ILLNESS
[de-identified] : 77-year-old female presents with lower back pain that started a month ago. She noes she has radiating pain down her right leg. She notes she has numbness of her right leg.  Patient states she has trouble walking long distances and is constantly looking for a place to sit. Patient presents ambulating with a cane in the left side. She notes when she wakes up she experience significant calf pain. She had a left knee Synvisc-One injection today 7/2/2024. she had a right knee Synvisc-One injection on 6/25/2024. She thinks her right knee has been swollen.  She notes she has had 3 right shoulder rotator cuff surgery in the past. The last one was in 2012. She is s/p right THR done 12/5/2017 by Dr. Cortez. PMHx- Notes allergies to Tylenol and Keflex. Denies a PCN allergy. She had a right THR in 2017.   Radiology Results 7/2/2024 o Right Shoulder : AP internal/external rotation and outlet views were obtained and revealed severe degenerative arthritis hardware in good consistent with rotator cuff repair and degenerative arthritis  o Left Shoulder : AP internal/external rotation and outlet views were obtained and revealed  mild degenerative arthritis of the glenohumeral and AC joint  Radiology Results 10/04/2023 o Lumbosacral Spine : AP and lateral views were obtained and revealed degenerative scoliosis degenerative disc disease with facet arthropathy and foraminal stenosis L5-S1, essential a fusion of L3-4 and right hip replacement  o Right Knee : Standing AP, lateral and tunnel views of the knee were obtained and revealed severe medial and moderate patellofemoral arthritis  o Left Knee : Standing AP, lateral and tunnel views of the knee were obtained and revealed severe medial and moderate patellofemoral arthritis   Radiology Results from 10/26/2022: o Lumbosacral Spine : AP and lateral views were obtained and reveal a degenerative scoliosis with diffuse DDD worse at L3-4.  o Pelvis : AP pelvis was obtained and reveal a right THR in good position. Moderate arthritis of the left hip. Left SI joint arthritis.  o Right Knee : Standing AP, lateral, tunnel, and skyline views of the knee were obtained and reveal moderate tricompartmental osteoarthritis worse in the medial compartment. No lytic lesions.  o Left Knee : Standing AP, lateral, tunnel, and skyline views of the knee were obtained and reveal moderate tricompartmental osteoarthritis worse in the medial compartment. No lytic lesions.

## 2024-09-24 ENCOUNTER — APPOINTMENT (OUTPATIENT)
Dept: ORTHOPEDIC SURGERY | Facility: CLINIC | Age: 79
End: 2024-09-24
Payer: MEDICARE

## 2024-09-24 VITALS — HEIGHT: 63 IN | WEIGHT: 142 LBS | BODY MASS INDEX: 25.16 KG/M2

## 2024-09-24 DIAGNOSIS — M48.062 SPINAL STENOSIS, LUMBAR REGION WITH NEUROGENIC CLAUDICATION: ICD-10-CM

## 2024-09-24 DIAGNOSIS — M17.0 BILATERAL PRIMARY OSTEOARTHRITIS OF KNEE: ICD-10-CM

## 2024-09-24 DIAGNOSIS — M47.816 SPONDYLOSIS W/OUT MYELOPATHY OR RADICULOPATHY, LUMBAR REGION: ICD-10-CM

## 2024-09-24 DIAGNOSIS — M16.0 BILATERAL PRIMARY OSTEOARTHRITIS OF HIP: ICD-10-CM

## 2024-09-24 PROCEDURE — 99214 OFFICE O/P EST MOD 30 MIN: CPT

## 2024-09-24 NOTE — PHYSICAL EXAM
[de-identified] : Constitutional o Appearance : well-nourished, well developed, alert, in no acute distress  Head and Face o Head :  Inspection : atraumatic, normocephalic o Face :  Inspection : no visible rash or discoloration Respiratory o Respiratory Effort: breathing unlabored  Neurologic o Mental Status Examination :  Orientation : alert and oriented X 3 Psychiatric o Mood and Affect: mood normal, affect appropriate Cardiovascular o Observation/Palpation : - no swelling Lymphatic o Additional Nodes : No palpable lymph nodes present  Cervical Spine o Inspection/Palpation :  Inspection : alignment midline, normal degree of lordosis present  Skin : normal appearance, no masses or tenderness, trachea midline  Palpation : musculature is nontender to palpation o Range of Motion : arc of motion full in all planes, no crepitance or pain with ROM o Tests: Negative Spurlings test   o Right Shoulder :  Inspection/Palpation : no tenderness, swelling or deformities  Range of Motion : full forward flexion, Full IR and ER,  no crepitance  Strength : forward elevation 4+/5, internal rotation 4+/5, external rotation 3+/5, adduction and abduction, supraspinatus 4+/5 , external rotation at 90 of abduction, biceps/triceps, 5/5  Stability : no joint instability on provocative testing   Tests: Cooper negative, Neer negative, Geronimo negative, negative drop arm test, Durham's test negative   Lumbosacral Spine o Inspection : no visible rash or discoloration, scoliosis  o Palpation : right sciatic notch tenderness, right SI joint tenderness  o Range of Motion: side bending to the left and to the right cause discomfort, rotation to the right causes discomfort, side bending to the right and left, obvious curve, right and left SIJ tenderness, forward flexion causes lower back discomfort  o Muscle Strength : paraspinal muscle strength and tone within normal limits o Muscle Tone : paraspinal muscle strength and tone within normal limits o Tests: straight leg test negative and GINGER test negative bilaterally   Right Lower Extremity o Buttock : no tenderness, swelling or deformities o Right Hip :  Inspection/Palpation : greater trochanteric tenderness, no swelling or deformities  Range of Motion : full flexion and rotation, no crepitance  Stability : joint stability intact  Strength : extension, flexion, adduction, abduction, internal rotation and external rotation 4+/5  Tests: Kelley's test negative  o Right Knee :  Inspection/Palpation : medial and lateral facet tenderness to palpation, no swelling, varus deformity R>L  Range of Motion : 0-130 painless, patellofemoral crepitance, good patellofemoral glide   Stability : no valgus or varus instability present on provocative testing  Strength : flexion and extension 4+/5  Tests and Signs : negative Anterior Drawer, negative Lachman, negative Charles   Left Lower Extremity o Buttock : no tenderness, swelling or deformities  o Left Hip :  Inspection/Palpation : greater trochanteric tenderness, no swelling or deformities  Range of Motion : full and painless in all planes, no crepitance  Stability : joint stability intact  Strength : extension, flexion, adduction, abduction, internal rotation and external rotation, 4+/5  Tests: Kelley's test negative  o Left Knee :  Inspection/Palpation : medial compartment tenderness to palpation, no lateral compartment tenderness, no swelling, small Baker's cyst, varus deformity R>L  Range of Motion : 0-135 painless, no crepitance, decrease patellofemoral glide   Stability : no valgus or varus instability present on provocative testing  Strength : flexion and extension 4+/5  Tests and Signs : negative Anterior Drawer, negative Lachman, negative Charles   Gait: varus deformity R>L knees, no significant extremity swelling or lymphedema, right leg half and inch longer than left leg, limited core strength, tight hamstrings, limited core strength, Patient presents ambulating with a cane. list to the right when she walks, balance is poor, decrease in the L4 distribution on the right, limited core strength

## 2024-09-24 NOTE — DISCUSSION/SUMMARY
[de-identified] : I went over the pathophysiology of the patient's symptoms in great detail with the patient. I discussed the underlying pathophysiology of the patient's condition in great detail with the patient. I went over the patient's x-rays with them in great detail. I am recommending the patient continues to go to physical therapy to obtain increases in their strength and mobility. A prescription was provided. We discussed the use of ice, Tylenol and anti-inflammatories to relieve pain.  We discussed the use of Salon pas at this time. Instructions were discussed with the patient.   All of their questions were answered. They understand and consent to the plan.   FU in 6-8 weeks.

## 2024-09-24 NOTE — ADDENDUM
[FreeTextEntry1] : I, DOUGLAS STERLING, acted solely as a scribe for Dr. Hussein Carlin on this date 09/24/2024.  All medical record entries made by the Scribe were at my, Dr. Hussein Carlin, direction and personally dictated by me on 09/24/2024. I have reviewed the chart and agree that the record accurately reflects my personal performance of the history, physical exam, assessment and plan. I have also personally directed, reviewed, and agreed with the chart.

## 2024-09-24 NOTE — HISTORY OF PRESENT ILLNESS
[de-identified] : 77-year-old female presents with lower back pain that started a month ago. Patient denies that she has any numbness or tingling. she notes back pain that is worse in the morning. She notes she has improved but continues to have pain. She notes she did not get any at home exercises. She has difficulty with rotation. She had a left knee Synvisc-One injection on 7/2/2024. she had a right knee Synvisc-One injection on 6/25/2024. She notes she has had 3 right shoulder rotator cuff surgery in the past. The last one was in 2012. She is s/p right THR done 12/5/2017 by Dr. Cortez. PMHx- Notes allergies to Tylenol and Keflex. Denies a PCN allergy. She had a right THR in 2017.   Radiology Results 8/15/2024 o Lumbosacral Spine: AP and lateral views were obtained and revealed degenerative disc disease worse at L3-4 and L4-5 and foraminal stenosis at L4-5 and mild right degenerative lumbar spine  o Pelvis: AP pelvis was obtained and revealed right THR in good position and moderate degenerative arthritis of the left hip and sacroiliac joint   Radiology Results 7/2/2024 o Right Shoulder : AP internal/external rotation and outlet views were obtained and revealed severe degenerative arthritis hardware in good consistent with rotator cuff repair and degenerative arthritis  o Left Shoulder : AP internal/external rotation and outlet views were obtained and revealed  mild degenerative arthritis of the glenohumeral and AC joint  Radiology Results 10/04/2023 o Lumbosacral Spine : AP and lateral views were obtained and revealed degenerative scoliosis degenerative disc disease with facet arthropathy and foraminal stenosis L5-S1, essential a fusion of L3-4 and right hip replacement  o Right Knee : Standing AP, lateral and tunnel views of the knee were obtained and revealed severe medial and moderate patellofemoral arthritis  o Left Knee : Standing AP, lateral and tunnel views of the knee were obtained and revealed severe medial and moderate patellofemoral arthritis   Radiology Results from 10/26/2022: o Lumbosacral Spine : AP and lateral views were obtained and reveal a degenerative scoliosis with diffuse DDD worse at L3-4.  o Pelvis : AP pelvis was obtained and reveal a right THR in good position. Moderate arthritis of the left hip. Left SI joint arthritis.  o Right Knee : Standing AP, lateral, tunnel, and skyline views of the knee were obtained and reveal moderate tricompartmental osteoarthritis worse in the medial compartment. No lytic lesions.  o Left Knee : Standing AP, lateral, tunnel, and skyline views of the knee were obtained and reveal moderate tricompartmental osteoarthritis worse in the medial compartment. No lytic lesions.

## 2024-11-05 ENCOUNTER — APPOINTMENT (OUTPATIENT)
Dept: ORTHOPEDIC SURGERY | Facility: CLINIC | Age: 79
End: 2024-11-05
Payer: MEDICARE

## 2024-11-05 VITALS — HEIGHT: 63 IN | WEIGHT: 145 LBS | BODY MASS INDEX: 25.69 KG/M2

## 2024-11-05 DIAGNOSIS — M70.61 TROCHANTERIC BURSITIS, RIGHT HIP: ICD-10-CM

## 2024-11-05 DIAGNOSIS — M48.062 SPINAL STENOSIS, LUMBAR REGION WITH NEUROGENIC CLAUDICATION: ICD-10-CM

## 2024-11-05 DIAGNOSIS — Z96.641 PRESENCE OF RIGHT ARTIFICIAL HIP JOINT: ICD-10-CM

## 2024-11-05 DIAGNOSIS — M47.816 SPONDYLOSIS W/OUT MYELOPATHY OR RADICULOPATHY, LUMBAR REGION: ICD-10-CM

## 2024-11-05 DIAGNOSIS — M16.12 UNILATERAL PRIMARY OSTEOARTHRITIS, LEFT HIP: ICD-10-CM

## 2024-11-05 PROCEDURE — 99214 OFFICE O/P EST MOD 30 MIN: CPT

## 2024-12-17 ENCOUNTER — APPOINTMENT (OUTPATIENT)
Dept: ORTHOPEDIC SURGERY | Facility: CLINIC | Age: 79
End: 2024-12-17
Payer: MEDICARE

## 2024-12-17 DIAGNOSIS — M48.062 SPINAL STENOSIS, LUMBAR REGION WITH NEUROGENIC CLAUDICATION: ICD-10-CM

## 2024-12-17 PROCEDURE — 99214 OFFICE O/P EST MOD 30 MIN: CPT

## 2025-03-31 ENCOUNTER — APPOINTMENT (OUTPATIENT)
Dept: ORTHOPEDIC SURGERY | Facility: CLINIC | Age: 80
End: 2025-03-31
Payer: MEDICARE

## 2025-03-31 VITALS — BODY MASS INDEX: 24.1 KG/M2 | WEIGHT: 136 LBS | HEIGHT: 63 IN

## 2025-03-31 DIAGNOSIS — M17.0 BILATERAL PRIMARY OSTEOARTHRITIS OF KNEE: ICD-10-CM

## 2025-03-31 PROCEDURE — 73564 X-RAY EXAM KNEE 4 OR MORE: CPT | Mod: 50

## 2025-03-31 PROCEDURE — 99213 OFFICE O/P EST LOW 20 MIN: CPT

## 2025-03-31 RX ORDER — HYALURONATE SODIUM, STABILIZED 88 MG/4 ML
88 SYRINGE (ML) INTRAARTICULAR
Qty: 2 | Refills: 0 | Status: ACTIVE | COMMUNITY
Start: 2025-03-31

## 2025-04-21 ENCOUNTER — APPOINTMENT (OUTPATIENT)
Dept: ORTHOPEDIC SURGERY | Facility: CLINIC | Age: 80
End: 2025-04-21
Payer: MEDICARE

## 2025-04-21 PROCEDURE — 20610 DRAIN/INJ JOINT/BURSA W/O US: CPT | Mod: RT

## 2025-04-21 PROCEDURE — 20611 DRAIN/INJ JOINT/BURSA W/US: CPT | Mod: RT

## 2025-04-28 ENCOUNTER — APPOINTMENT (OUTPATIENT)
Dept: ORTHOPEDIC SURGERY | Facility: CLINIC | Age: 80
End: 2025-04-28
Payer: MEDICARE

## 2025-04-28 DIAGNOSIS — M17.0 BILATERAL PRIMARY OSTEOARTHRITIS OF KNEE: ICD-10-CM

## 2025-04-28 PROCEDURE — 20611 DRAIN/INJ JOINT/BURSA W/US: CPT | Mod: LT

## 2025-05-12 ENCOUNTER — APPOINTMENT (OUTPATIENT)
Dept: ORTHOPEDIC SURGERY | Facility: CLINIC | Age: 80
End: 2025-05-12

## 2025-06-09 ENCOUNTER — APPOINTMENT (OUTPATIENT)
Dept: ORTHOPEDIC SURGERY | Facility: CLINIC | Age: 80
End: 2025-06-09
Payer: MEDICARE

## 2025-06-09 PROCEDURE — 99214 OFFICE O/P EST MOD 30 MIN: CPT

## 2025-06-09 PROCEDURE — 72100 X-RAY EXAM L-S SPINE 2/3 VWS: CPT

## 2025-06-09 PROCEDURE — 72170 X-RAY EXAM OF PELVIS: CPT | Mod: RT

## 2025-06-09 PROCEDURE — 73502 X-RAY EXAM HIP UNI 2-3 VIEWS: CPT

## 2025-07-14 ENCOUNTER — APPOINTMENT (OUTPATIENT)
Dept: ORTHOPEDIC SURGERY | Facility: CLINIC | Age: 80
End: 2025-07-14
Payer: MEDICARE

## 2025-07-14 PROBLEM — S60.211A CONTUSION OF RIGHT WRIST, INITIAL ENCOUNTER: Status: ACTIVE | Noted: 2025-07-14

## 2025-07-14 PROBLEM — S70.01XA CONTUSION OF RIGHT HIP, INITIAL ENCOUNTER: Status: ACTIVE | Noted: 2025-07-14

## 2025-07-14 PROCEDURE — 72170 X-RAY EXAM OF PELVIS: CPT | Mod: RT

## 2025-07-14 PROCEDURE — 99214 OFFICE O/P EST MOD 30 MIN: CPT

## 2025-07-14 PROCEDURE — 73502 X-RAY EXAM HIP UNI 2-3 VIEWS: CPT

## 2025-09-02 ENCOUNTER — APPOINTMENT (OUTPATIENT)
Dept: ORTHOPEDIC SURGERY | Facility: CLINIC | Age: 80
End: 2025-09-02

## 2025-09-15 ENCOUNTER — APPOINTMENT (OUTPATIENT)
Dept: ORTHOPEDIC SURGERY | Facility: CLINIC | Age: 80
End: 2025-09-15
Payer: MEDICARE

## 2025-09-15 DIAGNOSIS — M17.0 BILATERAL PRIMARY OSTEOARTHRITIS OF KNEE: ICD-10-CM

## 2025-09-15 PROCEDURE — 99213 OFFICE O/P EST LOW 20 MIN: CPT

## 2025-09-15 RX ORDER — HYALURONATE SODIUM, STABILIZED 88 MG/4 ML
88 SYRINGE (ML) INTRAARTICULAR
Qty: 2 | Refills: 0 | Status: ACTIVE | COMMUNITY
Start: 2025-09-15